# Patient Record
Sex: FEMALE | Race: ASIAN | NOT HISPANIC OR LATINO
[De-identification: names, ages, dates, MRNs, and addresses within clinical notes are randomized per-mention and may not be internally consistent; named-entity substitution may affect disease eponyms.]

---

## 2022-07-27 ENCOUNTER — NON-APPOINTMENT (OUTPATIENT)
Age: 57
End: 2022-07-27

## 2022-07-28 ENCOUNTER — INPATIENT (INPATIENT)
Facility: HOSPITAL | Age: 57
LOS: 2 days | Discharge: HOME | DRG: 446 | End: 2022-07-31
Attending: SURGERY | Admitting: SURGERY
Payer: MEDICAID

## 2022-07-28 VITALS
OXYGEN SATURATION: 96 % | RESPIRATION RATE: 16 BRPM | HEART RATE: 92 BPM | DIASTOLIC BLOOD PRESSURE: 86 MMHG | HEIGHT: 65 IN | SYSTOLIC BLOOD PRESSURE: 157 MMHG | WEIGHT: 179.9 LBS | TEMPERATURE: 98 F

## 2022-07-28 LAB
ALBUMIN SERPL ELPH-MCNC: 3.6 G/DL — SIGNIFICANT CHANGE UP (ref 3.3–5)
ALP SERPL-CCNC: 199 U/L — HIGH (ref 40–120)
ALT FLD-CCNC: 125 U/L — HIGH (ref 12–78)
ANION GAP SERPL CALC-SCNC: 11 MMOL/L — SIGNIFICANT CHANGE UP (ref 5–17)
APTT BLD: 35.7 SEC — HIGH (ref 27.5–35.5)
AST SERPL-CCNC: 43 U/L — HIGH (ref 15–37)
BASOPHILS # BLD AUTO: 0.02 K/UL — SIGNIFICANT CHANGE UP (ref 0–0.2)
BASOPHILS NFR BLD AUTO: 0.2 % — SIGNIFICANT CHANGE UP (ref 0–2)
BILIRUB SERPL-MCNC: 0.7 MG/DL — SIGNIFICANT CHANGE UP (ref 0.2–1.2)
BUN SERPL-MCNC: 6 MG/DL — LOW (ref 7–23)
CALCIUM SERPL-MCNC: 8.9 MG/DL — SIGNIFICANT CHANGE UP (ref 8.5–10.1)
CHLORIDE SERPL-SCNC: 105 MMOL/L — SIGNIFICANT CHANGE UP (ref 96–108)
CK MB CFR SERPL CALC: <1 NG/ML — SIGNIFICANT CHANGE UP (ref 0–3.6)
CO2 SERPL-SCNC: 24 MMOL/L — SIGNIFICANT CHANGE UP (ref 22–31)
CREAT SERPL-MCNC: 0.6 MG/DL — SIGNIFICANT CHANGE UP (ref 0.5–1.3)
EGFR: 104 ML/MIN/1.73M2 — SIGNIFICANT CHANGE UP
EOSINOPHIL # BLD AUTO: 0.09 K/UL — SIGNIFICANT CHANGE UP (ref 0–0.5)
EOSINOPHIL NFR BLD AUTO: 0.8 % — SIGNIFICANT CHANGE UP (ref 0–6)
GLUCOSE SERPL-MCNC: 128 MG/DL — HIGH (ref 70–99)
HCT VFR BLD CALC: 36.9 % — SIGNIFICANT CHANGE UP (ref 34.5–45)
HGB BLD-MCNC: 11.7 G/DL — SIGNIFICANT CHANGE UP (ref 11.5–15.5)
IMM GRANULOCYTES NFR BLD AUTO: 0.5 % — SIGNIFICANT CHANGE UP (ref 0–1.5)
INR BLD: 1.05 RATIO — SIGNIFICANT CHANGE UP (ref 0.88–1.16)
LIDOCAIN IGE QN: 186 U/L — SIGNIFICANT CHANGE UP (ref 73–393)
LYMPHOCYTES # BLD AUTO: 1.85 K/UL — SIGNIFICANT CHANGE UP (ref 1–3.3)
LYMPHOCYTES # BLD AUTO: 16.9 % — SIGNIFICANT CHANGE UP (ref 13–44)
MCHC RBC-ENTMCNC: 22.9 PG — LOW (ref 27–34)
MCHC RBC-ENTMCNC: 31.7 GM/DL — LOW (ref 32–36)
MCV RBC AUTO: 72.1 FL — LOW (ref 80–100)
MONOCYTES # BLD AUTO: 0.69 K/UL — SIGNIFICANT CHANGE UP (ref 0–0.9)
MONOCYTES NFR BLD AUTO: 6.3 % — SIGNIFICANT CHANGE UP (ref 2–14)
NEUTROPHILS # BLD AUTO: 8.24 K/UL — HIGH (ref 1.8–7.4)
NEUTROPHILS NFR BLD AUTO: 75.3 % — SIGNIFICANT CHANGE UP (ref 43–77)
NRBC # BLD: 0 /100 WBCS — SIGNIFICANT CHANGE UP (ref 0–0)
PLATELET # BLD AUTO: 396 K/UL — SIGNIFICANT CHANGE UP (ref 150–400)
POTASSIUM SERPL-MCNC: 3.3 MMOL/L — LOW (ref 3.5–5.3)
POTASSIUM SERPL-SCNC: 3.3 MMOL/L — LOW (ref 3.5–5.3)
PROT SERPL-MCNC: 8.6 G/DL — HIGH (ref 6–8.3)
PROTHROM AB SERPL-ACNC: 12.3 SEC — SIGNIFICANT CHANGE UP (ref 10.5–13.4)
RBC # BLD: 5.12 M/UL — SIGNIFICANT CHANGE UP (ref 3.8–5.2)
RBC # FLD: 15.8 % — HIGH (ref 10.3–14.5)
SARS-COV-2 RNA SPEC QL NAA+PROBE: SIGNIFICANT CHANGE UP
SODIUM SERPL-SCNC: 140 MMOL/L — SIGNIFICANT CHANGE UP (ref 135–145)
TROPONIN I, HIGH SENSITIVITY RESULT: 4.2 NG/L — SIGNIFICANT CHANGE UP
WBC # BLD: 10.95 K/UL — HIGH (ref 3.8–10.5)
WBC # FLD AUTO: 10.95 K/UL — HIGH (ref 3.8–10.5)

## 2022-07-28 PROCEDURE — 71045 X-RAY EXAM CHEST 1 VIEW: CPT | Mod: 26

## 2022-07-28 PROCEDURE — 93010 ELECTROCARDIOGRAM REPORT: CPT

## 2022-07-28 PROCEDURE — 99285 EMERGENCY DEPT VISIT HI MDM: CPT

## 2022-07-28 PROCEDURE — 76705 ECHO EXAM OF ABDOMEN: CPT | Mod: 26

## 2022-07-28 RX ORDER — SODIUM CHLORIDE 9 MG/ML
1000 INJECTION INTRAMUSCULAR; INTRAVENOUS; SUBCUTANEOUS ONCE
Refills: 0 | Status: COMPLETED | OUTPATIENT
Start: 2022-07-28 | End: 2022-07-28

## 2022-07-28 RX ORDER — FAMOTIDINE 10 MG/ML
20 INJECTION INTRAVENOUS ONCE
Refills: 0 | Status: COMPLETED | OUTPATIENT
Start: 2022-07-28 | End: 2022-07-28

## 2022-07-28 RX ORDER — ACETAMINOPHEN 500 MG
1000 TABLET ORAL ONCE
Refills: 0 | Status: COMPLETED | OUTPATIENT
Start: 2022-07-28 | End: 2022-07-28

## 2022-07-28 RX ADMIN — Medication 400 MILLIGRAM(S): at 22:34

## 2022-07-28 RX ADMIN — Medication 1000 MILLIGRAM(S): at 22:49

## 2022-07-28 RX ADMIN — FAMOTIDINE 20 MILLIGRAM(S): 10 INJECTION INTRAVENOUS at 22:34

## 2022-07-28 RX ADMIN — SODIUM CHLORIDE 1000 MILLILITER(S): 9 INJECTION INTRAMUSCULAR; INTRAVENOUS; SUBCUTANEOUS at 22:35

## 2022-07-28 RX ADMIN — Medication 1000 MILLIGRAM(S): at 23:00

## 2022-07-28 NOTE — ED ADULT NURSE NOTE - OBJECTIVE STATEMENT
57 yr old female c/o epigastric/RUQ abdominal pain radiating to the right flank. A+O x 4. Speaks German only. Daughter at bedside and translating. Pt was BIBA from urgent care for ED evaluation. RUQ tenderness noted on exam. Pt reports nausea without vomiting. Tolerating food without incident, but reports increased pain 1-2 hours after eating. + BS in all quadrants. S1/S2. Lungs clear dee. Resp even and unlabored on room air. skin warm dry and intact.

## 2022-07-28 NOTE — ED PROVIDER NOTE - OBJECTIVE STATEMENT
Patient is a 57-year-old female with past medical history of hypertension hyperlipidemia hysterectomy complaining of intermittent epigastric pain for 3 days worsening today pain radiating to right side back patient denies any nausea vomiting fever chills urinary symptoms blood in the urine chest pain shortness of breath.  Patient went to urgent care and advised to come to the ER.    pcp Conor

## 2022-07-28 NOTE — ED PROVIDER NOTE - ATTENDING APP SHARED VISIT CONTRIBUTION OF CARE
56yo female with RUQ Pain for the last few days no vomiting or diarrhea  exam:+RUQ tenderness no rebound or guadring  plan: labs, us, r/o eagle  agree with assessment and plan of PA

## 2022-07-28 NOTE — ED ADULT TRIAGE NOTE - CHIEF COMPLAINT QUOTE
Patient brought in by ambulance from urgent care as reported complaining of epigastric pain radiating to the back was referred to ED

## 2022-07-29 ENCOUNTER — TRANSCRIPTION ENCOUNTER (OUTPATIENT)
Age: 57
End: 2022-07-29

## 2022-07-29 DIAGNOSIS — I10 ESSENTIAL (PRIMARY) HYPERTENSION: ICD-10-CM

## 2022-07-29 DIAGNOSIS — Z90.710 ACQUIRED ABSENCE OF BOTH CERVIX AND UTERUS: Chronic | ICD-10-CM

## 2022-07-29 DIAGNOSIS — E87.6 HYPOKALEMIA: ICD-10-CM

## 2022-07-29 DIAGNOSIS — R74.8 ABNORMAL LEVELS OF OTHER SERUM ENZYMES: ICD-10-CM

## 2022-07-29 DIAGNOSIS — K80.20 CALCULUS OF GALLBLADDER WITHOUT CHOLECYSTITIS WITHOUT OBSTRUCTION: ICD-10-CM

## 2022-07-29 DIAGNOSIS — E78.5 HYPERLIPIDEMIA, UNSPECIFIED: ICD-10-CM

## 2022-07-29 LAB
ALBUMIN SERPL ELPH-MCNC: 3.3 G/DL — SIGNIFICANT CHANGE UP (ref 3.3–5)
ALP SERPL-CCNC: 183 U/L — HIGH (ref 40–120)
ALT FLD-CCNC: 100 U/L — HIGH (ref 12–78)
ANION GAP SERPL CALC-SCNC: 9 MMOL/L — SIGNIFICANT CHANGE UP (ref 5–17)
AST SERPL-CCNC: 31 U/L — SIGNIFICANT CHANGE UP (ref 15–37)
BILIRUB SERPL-MCNC: 0.6 MG/DL — SIGNIFICANT CHANGE UP (ref 0.2–1.2)
BUN SERPL-MCNC: 5 MG/DL — LOW (ref 7–23)
CALCIUM SERPL-MCNC: 8.8 MG/DL — SIGNIFICANT CHANGE UP (ref 8.5–10.1)
CHLORIDE SERPL-SCNC: 107 MMOL/L — SIGNIFICANT CHANGE UP (ref 96–108)
CO2 SERPL-SCNC: 25 MMOL/L — SIGNIFICANT CHANGE UP (ref 22–31)
CREAT SERPL-MCNC: 0.52 MG/DL — SIGNIFICANT CHANGE UP (ref 0.5–1.3)
EGFR: 108 ML/MIN/1.73M2 — SIGNIFICANT CHANGE UP
GLUCOSE SERPL-MCNC: 104 MG/DL — HIGH (ref 70–99)
HCT VFR BLD CALC: 34.5 % — SIGNIFICANT CHANGE UP (ref 34.5–45)
HGB BLD-MCNC: 10.8 G/DL — LOW (ref 11.5–15.5)
INR BLD: 1.08 RATIO — SIGNIFICANT CHANGE UP (ref 0.88–1.16)
MCHC RBC-ENTMCNC: 22.9 PG — LOW (ref 27–34)
MCHC RBC-ENTMCNC: 31.3 GM/DL — LOW (ref 32–36)
MCV RBC AUTO: 73.2 FL — LOW (ref 80–100)
NRBC # BLD: 0 /100 WBCS — SIGNIFICANT CHANGE UP (ref 0–0)
PLATELET # BLD AUTO: 351 K/UL — SIGNIFICANT CHANGE UP (ref 150–400)
POTASSIUM SERPL-MCNC: 3.5 MMOL/L — SIGNIFICANT CHANGE UP (ref 3.5–5.3)
POTASSIUM SERPL-SCNC: 3.5 MMOL/L — SIGNIFICANT CHANGE UP (ref 3.5–5.3)
PROT SERPL-MCNC: 8 G/DL — SIGNIFICANT CHANGE UP (ref 6–8.3)
PROTHROM AB SERPL-ACNC: 12.6 SEC — SIGNIFICANT CHANGE UP (ref 10.5–13.4)
RBC # BLD: 4.71 M/UL — SIGNIFICANT CHANGE UP (ref 3.8–5.2)
RBC # FLD: 15.8 % — HIGH (ref 10.3–14.5)
SODIUM SERPL-SCNC: 141 MMOL/L — SIGNIFICANT CHANGE UP (ref 135–145)
WBC # BLD: 9.33 K/UL — SIGNIFICANT CHANGE UP (ref 3.8–10.5)
WBC # FLD AUTO: 9.33 K/UL — SIGNIFICANT CHANGE UP (ref 3.8–10.5)

## 2022-07-29 PROCEDURE — 99222 1ST HOSP IP/OBS MODERATE 55: CPT

## 2022-07-29 PROCEDURE — 74177 CT ABD & PELVIS W/CONTRAST: CPT | Mod: 26

## 2022-07-29 PROCEDURE — 99223 1ST HOSP IP/OBS HIGH 75: CPT

## 2022-07-29 PROCEDURE — 78226 HEPATOBILIARY SYSTEM IMAGING: CPT | Mod: 26

## 2022-07-29 RX ORDER — SUCRALFATE 1 G
1 TABLET ORAL
Refills: 0 | Status: DISCONTINUED | OUTPATIENT
Start: 2022-07-29 | End: 2022-07-31

## 2022-07-29 RX ORDER — PIPERACILLIN AND TAZOBACTAM 4; .5 G/20ML; G/20ML
3.38 INJECTION, POWDER, LYOPHILIZED, FOR SOLUTION INTRAVENOUS EVERY 8 HOURS
Refills: 0 | Status: DISCONTINUED | OUTPATIENT
Start: 2022-07-29 | End: 2022-07-31

## 2022-07-29 RX ORDER — PIPERACILLIN AND TAZOBACTAM 4; .5 G/20ML; G/20ML
3.38 INJECTION, POWDER, LYOPHILIZED, FOR SOLUTION INTRAVENOUS ONCE
Refills: 0 | Status: COMPLETED | OUTPATIENT
Start: 2022-07-29 | End: 2022-07-29

## 2022-07-29 RX ORDER — HYDROMORPHONE HYDROCHLORIDE 2 MG/ML
0.5 INJECTION INTRAMUSCULAR; INTRAVENOUS; SUBCUTANEOUS EVERY 4 HOURS
Refills: 0 | Status: DISCONTINUED | OUTPATIENT
Start: 2022-07-29 | End: 2022-07-31

## 2022-07-29 RX ORDER — SINCALIDE 5 UG/5ML
1.6 INJECTION, POWDER, LYOPHILIZED, FOR SOLUTION INTRAVENOUS ONCE
Refills: 0 | Status: COMPLETED | OUTPATIENT
Start: 2022-07-29 | End: 2022-07-29

## 2022-07-29 RX ORDER — HYDROMORPHONE HYDROCHLORIDE 2 MG/ML
1 INJECTION INTRAMUSCULAR; INTRAVENOUS; SUBCUTANEOUS EVERY 4 HOURS
Refills: 0 | Status: DISCONTINUED | OUTPATIENT
Start: 2022-07-29 | End: 2022-07-31

## 2022-07-29 RX ORDER — POTASSIUM CHLORIDE 20 MEQ
10 PACKET (EA) ORAL
Refills: 0 | Status: COMPLETED | OUTPATIENT
Start: 2022-07-29 | End: 2022-07-29

## 2022-07-29 RX ORDER — PANTOPRAZOLE SODIUM 20 MG/1
40 TABLET, DELAYED RELEASE ORAL
Refills: 0 | Status: DISCONTINUED | OUTPATIENT
Start: 2022-07-29 | End: 2022-07-29

## 2022-07-29 RX ORDER — KETOROLAC TROMETHAMINE 30 MG/ML
15 SYRINGE (ML) INJECTION EVERY 6 HOURS
Refills: 0 | Status: DISCONTINUED | OUTPATIENT
Start: 2022-07-29 | End: 2022-07-31

## 2022-07-29 RX ORDER — PANTOPRAZOLE SODIUM 20 MG/1
40 TABLET, DELAYED RELEASE ORAL EVERY 12 HOURS
Refills: 0 | Status: DISCONTINUED | OUTPATIENT
Start: 2022-07-29 | End: 2022-07-31

## 2022-07-29 RX ORDER — SODIUM CHLORIDE 9 MG/ML
1000 INJECTION, SOLUTION INTRAVENOUS
Refills: 0 | Status: DISCONTINUED | OUTPATIENT
Start: 2022-07-29 | End: 2022-07-31

## 2022-07-29 RX ORDER — POLYETHYLENE GLYCOL 3350 17 G/17G
17 POWDER, FOR SOLUTION ORAL DAILY
Refills: 0 | Status: DISCONTINUED | OUTPATIENT
Start: 2022-07-29 | End: 2022-07-31

## 2022-07-29 RX ADMIN — SINCALIDE 103.2 MICROGRAM(S): 5 INJECTION, POWDER, LYOPHILIZED, FOR SOLUTION INTRAVENOUS at 11:40

## 2022-07-29 RX ADMIN — PANTOPRAZOLE SODIUM 40 MILLIGRAM(S): 20 TABLET, DELAYED RELEASE ORAL at 17:33

## 2022-07-29 RX ADMIN — HYDROMORPHONE HYDROCHLORIDE 1 MILLIGRAM(S): 2 INJECTION INTRAMUSCULAR; INTRAVENOUS; SUBCUTANEOUS at 06:57

## 2022-07-29 RX ADMIN — Medication 100 MILLIEQUIVALENT(S): at 03:30

## 2022-07-29 RX ADMIN — Medication 100 MILLIEQUIVALENT(S): at 06:32

## 2022-07-29 RX ADMIN — HYDROMORPHONE HYDROCHLORIDE 0.5 MILLIGRAM(S): 2 INJECTION INTRAMUSCULAR; INTRAVENOUS; SUBCUTANEOUS at 21:45

## 2022-07-29 RX ADMIN — HYDROMORPHONE HYDROCHLORIDE 0.5 MILLIGRAM(S): 2 INJECTION INTRAMUSCULAR; INTRAVENOUS; SUBCUTANEOUS at 22:15

## 2022-07-29 RX ADMIN — HYDROMORPHONE HYDROCHLORIDE 1 MILLIGRAM(S): 2 INJECTION INTRAMUSCULAR; INTRAVENOUS; SUBCUTANEOUS at 07:30

## 2022-07-29 RX ADMIN — PIPERACILLIN AND TAZOBACTAM 25 GRAM(S): 4; .5 INJECTION, POWDER, LYOPHILIZED, FOR SOLUTION INTRAVENOUS at 20:38

## 2022-07-29 RX ADMIN — PIPERACILLIN AND TAZOBACTAM 25 GRAM(S): 4; .5 INJECTION, POWDER, LYOPHILIZED, FOR SOLUTION INTRAVENOUS at 12:28

## 2022-07-29 RX ADMIN — PIPERACILLIN AND TAZOBACTAM 200 GRAM(S): 4; .5 INJECTION, POWDER, LYOPHILIZED, FOR SOLUTION INTRAVENOUS at 03:20

## 2022-07-29 RX ADMIN — Medication 100 MILLIEQUIVALENT(S): at 05:20

## 2022-07-29 NOTE — H&P ADULT - PROBLEM SELECTOR PLAN 1
- Admit to surgical service  - NPO, IV fluids  - IV zosyn  - HIDA in AM  - Repeat CBC, CMP in AM  - PPI  - Medical and GI consults called  - Discussed with Dr. Marquez

## 2022-07-29 NOTE — H&P ADULT - HISTORY OF PRESENT ILLNESS
57 year old female with PMH HTN, HLD, s/p hysterectomy, presents with 3 day history of intermittent epigastric abdominal pain, worse with eating and inspiration, 8/10 at the worst, with 1 episode of vomiting, although patient currently without nausea. Also reports decreased PO intake due to pain. She also endorses constipation; last BM this AM was small and hard. She denies any fever, chills, chest pain, shortness of breath, hematemesis, diarrhea.

## 2022-07-29 NOTE — H&P ADULT - NSHPLABSRESULTS_GEN_ALL_CORE
LABS:                   11.7   10.95 )-----------( 396      ( 28 Jul 2022 21:15 )             36.9     07-28    140  |  105  |  6<L>  ----------------------------<  128<H>  3.3<L>   |  24  |  0.60    Ca    8.9      28 Jul 2022 21:15    TPro  8.6<H>  /  Alb  3.6  /  TBili  0.7  /  DBili  x   /  AST  43<H>  /  ALT  125<H>  /  AlkPhos  199<H>  07-28    PT/INR - ( 28 Jul 2022 21:15 )   PT: 12.3 sec;   INR: 1.05 ratio  PTT - ( 28 Jul 2022 21:15 )  PTT:35.7 sec    RADIOLOGY:  < from: US Abdomen Upper Quadrant Right (07.28.22 @ 22:33) >  IMPRESSION:  Cholelithiasis with a mildly dilated common bile duct. Cannot exclude   distal choledocholithiasis. No sonographic evidence of acute   cholecystitis.  Right renal pelvic calcification with pelviectasis.

## 2022-07-29 NOTE — H&P ADULT - NSHPPHYSICALEXAM_GEN_ALL_CORE
GENERAL:  Well-nourished, well-developed female lying comfortably in bed in NAD.  HEENT:  NC/AT. Sclera white.  CARDIO:  Regular rate and rhythm.  No murmur, gallop or rub appreciated.  RESPIRATORY:  Nonlabored breathing, no accessory muscle use. Lungs clear to auscultation bilaterally.  No wheezing, rales or rhonchi appreciated.  ABDOMEN:  Soft, nondistended, +moderate tenderness in epigastric region. No rebound tenderness or guarding.  SKIN:  No jaundice, pallor, or cyanosis  NEURO:  Alert

## 2022-07-29 NOTE — CONSULT NOTE ADULT - PROBLEM SELECTOR RECOMMENDATION 2
likely in the setting of possible choledocolithiasis   AST 43  alk phos 199  avoid hepatotoxic medications   fu am labs   GI consult

## 2022-07-29 NOTE — CONSULT NOTE ADULT - SUBJECTIVE AND OBJECTIVE BOX
[facility]  Austin Chan PA-C  [address] [place] [id number]  [phone]      Chief Complaint:  Patient is a 57y old  Female who presents with a chief complaint of Abdominal pain (29 Jul 2022 04:13)      HPI:57 year old female with PMH HTN, HLD, s/p hysterectomy, presents with 3 day history of intermittent epigastric abdominal pain, worse with eating and inspiration, 8/10 at the worst, with 1 episode of vomiting, although patient currently without nausea. Also reports decreased PO intake due to pain. She also endorses constipation; last BM this AM was small and hard. She denies any fever, chills, chest pain, shortness of breath, hematemesis, diarrhea.    Allergies:  No Known Allergies      Medications:  HYDROmorphone  Injectable 0.5 milliGRAM(s) IV Push every 4 hours PRN  HYDROmorphone  Injectable 1 milliGRAM(s) IV Push every 4 hours PRN  ketorolac   Injectable 15 milliGRAM(s) IV Push every 6 hours PRN  lactated ringers. 1000 milliLiter(s) IV Continuous <Continuous>  pantoprazole    Tablet 40 milliGRAM(s) Oral every 12 hours  piperacillin/tazobactam IVPB.. 3.375 Gram(s) IV Intermittent every 8 hours  polyethylene glycol 3350 17 Gram(s) Oral daily      PMHX/PSHX:  Hypertension    Hyperlipidemia    S/P hysterectomy        Family history:      Social History:     ROS:     General:  No wt loss, fevers, chills, night sweats, fatigue,   Eyes:  Good vision, no reported pain  ENT:  No sore throat, pain, runny nose, dysphagia  CV:  No pain, palpitations, hypo/hypertension  Resp:  No dyspnea, cough, tachypnea, wheezing  GI:  No pain, No nausea, No vomiting, No diarrhea, No constipation, No weight loss, No fever, No pruritis, No rectal bleeding, No tarry stools, No dysphagia,  :  No pain, bleeding, incontinence, nocturia  Muscle:  No pain, weakness  Neuro:  No weakness, tingling, memory problems  Psych:  No fatigue, insomnia, mood problems, depression  Endocrine:  No polyuria, polydipsia, cold/heat intolerance  Heme:  No petechiae, ecchymosis, easy bruisability  Skin:  No rash, tattoos, scars, edema      PHYSICAL EXAM:   Vital Signs:  Vital Signs Last 24 Hrs  T(C): 37.1 (29 Jul 2022 15:05), Max: 37.3 (29 Jul 2022 00:21)  T(F): 98.7 (29 Jul 2022 15:05), Max: 99.2 (29 Jul 2022 00:21)  HR: 80 (29 Jul 2022 15:05) (78 - 92)  BP: 149/87 (29 Jul 2022 15:05) (136/75 - 159/90)  BP(mean): --  RR: 17 (29 Jul 2022 15:05) (15 - 18)  SpO2: 98% (29 Jul 2022 15:05) (96% - 98%)    Parameters below as of 29 Jul 2022 15:05  Patient On (Oxygen Delivery Method): room air      Daily Height in cm: 165.1 (29 Jul 2022 15:22)    Daily     GENERAL:  Appears stated age,   HEENT:  NC/AT,    CHEST:  Full & symmetric excursion,   HEART:  Regular rhythm  ABDOMEN:  Soft, non-tender, non-distended,   EXTEREMITIES:  no cyanosis,clubbing or edema  SKIN:  No rash  NEURO:  Alert,    LABS:                        10.8   9.33  )-----------( 351      ( 29 Jul 2022 09:54 )             34.5     07-29    141  |  107  |  5<L>  ----------------------------<  104<H>  3.5   |  25  |  0.52    Ca    8.8      29 Jul 2022 09:54    TPro  8.0  /  Alb  3.3  /  TBili  0.6  /  DBili  x   /  AST  31  /  ALT  100<H>  /  AlkPhos  183<H>  07-29    LIVER FUNCTIONS - ( 29 Jul 2022 09:54 )  Alb: 3.3 g/dL / Pro: 8.0 g/dL / ALK PHOS: 183 U/L / ALT: 100 U/L / AST: 31 U/L / GGT: x           PT/INR - ( 29 Jul 2022 12:46 )   PT: 12.6 sec;   INR: 1.08 ratio         PTT - ( 28 Jul 2022 21:15 )  PTT:35.7 sec    Amylase Serum--      Lipase [id number]       Ammonia--      Imaging:          
Patient is a 57y old  Female who presents with a chief complaint of Abdominal pain (29 Jul 2022 02:20)      FROM ADMISSION H+P:   HPI:  57 year old female with PMH HTN, HLD, s/p hysterectomy, presents with 3 day history of intermittent epigastric abdominal pain, worse with eating and inspiration, 8/10 at the worst, with 1 episode of vomiting, although patient currently without nausea. Also reports decreased PO intake due to pain. She also endorses constipation; last BM this AM was small and hard. She denies any fever, chills, chest pain, shortness of breath, hematemesis, diarrhea. (29 Jul 2022 02:20)      ----  INTERVAL HPI/OVERNIGHT EVENTS: Pt seen and evaluated at the bedside. Asked to consult on patient for medical comanagement. 57 y F PMHx HTN, HLD presents with complaint of abdominal pain. Patient with 3 days of intermittent epigastric pain with nausea and vomiting. No previous similar episodes. Pain 8/10 on arrival. Now complaining of mild epigastric pain. Denies current nausea. Denies problems with anesthesia in the past. Is active in daily life. METs>4. Not on any blood thinners. Daughter at bedside providing translation per patient request.     ----  PAST MEDICAL & SURGICAL HISTORY:  Hypertension      Hyperlipidemia      S/P hysterectomy  2011          ----  Home medications:    ----  FAMILY HISTORY: denies history of heart disease       ----  Allergies    No Known Allergies    Intolerances        ----  Social History:  - etoh: denies  - tobacco: denies  - recreational drug use: denies  - occupation:  - living circumstances: family   - ambulation status: independent     ----  REVIEW OF SYSTEMS:  CONSTITUTIONAL: denies fever, chills, fatigue, weakness  HEENT: denies blurred vision, sore throat  SKIN: denies new lesions, rash  CARDIOVASCULAR: denies chest pain, chest pressure, palpitations  RESPIRATORY: denies shortness of breath, sputum production  GASTROINTESTINAL: admits nausea, vomiting, diarrhea, abdominal pain  GENITOURINARY: denies dysuria, discharge  NEUROLOGICAL: denies numbness, headache, focal weakness  MUSCULOSKELETAL: denies new joint pain, muscle aches  HEMATOLOGIC: denies gross bleeding, bruising  LYMPHATICS: denies enlarged lymph nodes, extremity swelling  PSYCHIATRIC: denies recent changes in anxiety, depression  ENDOCRINOLOGIC: denies sweating, cold or heat intolerance    ----  PHYSICAL EXAM:  GENERAL: patient appears well, no acute distress, appropriately interactive  EYES: sclera clear, no exudates  ENMT: oropharynx clear without erythema, moist mucous membranes  NECK: supple, soft, no thyromegaly noted  LUNGS: good air entry bilaterally, clear to auscultation, symmetric breath sounds, no wheezing or rhonchi appreciated  HEART: soft S1/S2, regular rate and rhythm, no murmurs noted, no noted edema to b/l LE  GASTROINTESTINAL: abdomen is soft, TTP in RUQ and epigastrum, nondistended, normoactive bowel sounds, no palpable masses  INTEGUMENT: good skin turgor, appropriate for ethnicity, appears well perfused, no jaundice noted  MUSCULOSKELETAL: no clubbing or cyanosis, no obvious deformity  NEUROLOGIC: awake, alert, oriented x3, good muscle tone in 4 extremities, no obvious sensory deficits  PSYCHIATRIC: mood is good, affect is congruent with mood, linear and logical thought process  HEME/LYMPH: no palpable supraclavicular nodules, no obvious ecchymosis     T(C): 37.3 (07-29-22 @ 00:21), Max: 37.3 (07-29-22 @ 00:21)  HR: 78 (07-29-22 @ 00:21) (78 - 92)  BP: 136/75 (07-29-22 @ 00:21) (136/75 - 157/86)  RR: 15 (07-29-22 @ 00:21) (15 - 16)  SpO2: 97% (07-29-22 @ 00:21) (96% - 97%)  Wt(kg): --    ----  I&O's Summary      LABS:                        11.7   10.95 )-----------( 396      ( 28 Jul 2022 21:15 )             36.9     07-28    140  |  105  |  6<L>  ----------------------------<  128<H>  3.3<L>   |  24  |  0.60    Ca    8.9      28 Jul 2022 21:15    TPro  8.6<H>  /  Alb  3.6  /  TBili  0.7  /  DBili  x   /  AST  43<H>  /  ALT  125<H>  /  AlkPhos  199<H>  07-28    PT/INR - ( 28 Jul 2022 21:15 )   PT: 12.3 sec;   INR: 1.05 ratio         PTT - ( 28 Jul 2022 21:15 )  PTT:35.7 sec    CAPILLARY BLOOD GLUCOSE          < from: US Abdomen Upper Quadrant Right (07.28.22 @ 22:33) >    ACC: [id number] EXAM:  US ABDOMEN RT UPR QUADRANT                          PROCEDURE DATE:  07/28/2022          INTERPRETATION:  CLINICAL INFORMATION: Right upper quadrant pain    COMPARISON: None available.    TECHNIQUE: Sonography of the right upper quadrant.    FINDINGS:  Liver: Within normal limits.  Bile ducts: Normal caliber. Common bile duct measures 7 mm.  Gallbladder: Cholelithiasis.  No focal tenderness or evidence of   cholecystitis.  Pancreas: Visualized portions are within normal limits.  Right kidney: 10.8 cm. No hydronephrosis. 2.4 cm pelvic calcification.   Mild pelviectasis..  Ascites: None.  IVC: Visualized portions are within normal limits.    IMPRESSION:  Cholelithiasis with a mildly dilated common bile duct. Cannot exclude   distal choledocholithiasis. No sonographic evidence of acute   cholecystitis.    Right renal pelvic calcification with pelviectasis.    --- End of Report ---            MATT JARAMILLO MD; Attending Radiologist  This document has been electronically signed. Jul 29 2022 12:03AM    < end of copied text >            ----  Personally reviewed:  Vital sign trends: [ x ] yes    [  ] no     [  ] n/a  Laboratory results: [ x ] yes    [  ] no     [  ] n/a  Radiology results: [x  ] yes    [  ] no     [  ] n/a  Culture results: [xs    [  ] no     [  ] n/a  Consultant recommendations: [ x ] yes    [  ] no     [  ] n/a

## 2022-07-29 NOTE — CONSULT NOTE ADULT - ASSESSMENT
abdominal pain  elevated lfts    lfts improved  hida negative  ? pud  agree with ct  agree with proton pump inhibitor  add carafate  d/w son    I reviewed the overnight course of events on the unit, re-confirming the patient history. I discussed the care with the patient and their family  The plan of care was discussed with the physician assistant and modifications were made to the notation where appropriate.   Differential diagnosis and plan of care discussed with patient after the evaluation  35 minutes spent on total encounter of which more than fifty percent of the encounter was spent counseling and/or coordinating care by the attending physician.  Advanced care planning was discussed with patient and family.  Advanced care planning forms were reviewed and discussed.  Risks, benefits and alternatives of gastroenterologic procedures were discussed in detail and all questions were answered.  
57 year old female with PMH HTN, HLD, s/p hysterectomy, here with cholelithiasis, possible choledocholithiasis, r/o acute cholecystitis.

## 2022-07-29 NOTE — CHART NOTE - NSCHARTNOTEFT_GEN_A_CORE
Patient admitted early this AM. Please see initial consult note for more detailed info.  Briefly, this is a 57 year old female with PMH HTN, HLD, s/p hysterectomy, here with cholelithiasis, possible choledocholithiasis, r/o acute cholecystitis.    Labs, vitals, notes, orders reviewed.    R/o acute cholecystitis:  -Continue Zosyn  -Continue IV fluids  -NPO  -Pain control  -Zofran PRN  -Decision re: surgical intervention pending HIDA results.   -GI consult pending

## 2022-07-29 NOTE — ED ADULT NURSE REASSESSMENT NOTE - NS ED NURSE REASSESS COMMENT FT1
Patient and report received at 0710.  Patient speaks German, she was able to communicate that pain medication helped.  per night RN daughter has been at bedside all night helping with translation.  patient is alert and calm, no acute distress noted.
Primary RN and charge RN attempted to place additional IV line without success.  Patient refuses to allow additional attempt.  Will hang LR after Zosyn dose is complete.
 #[id number] for German Language.  Patient understands she is waiting for the surgical team to come and evaluate her.  She understands she cannot eat or drink anything.  She understands she will need to remove her jewelry if she has to go to the OR.  Patient provided with a hospital gown so that she can remove her clothes and put them into a bag.  Her questions and concerns were addressed.  She denies needs at this time.

## 2022-07-29 NOTE — CONSULT NOTE ADULT - PROBLEM SELECTOR RECOMMENDATION 9
abdominal pain, nausea and vomiting  RUQ US with Cholelithiasis with a mildly dilated common bile duct. Cannot exclude distal choledocholithiasis. No sonographic evidence of acute cholecystitis.  admit to surgical service   NPO with IVF   continue zosyn   HIDA scan in AM  Agree with GI consult   fu am labs   fu cultures  pain control per primary team

## 2022-07-29 NOTE — H&P ADULT - NSHPREVIEWOFSYSTEMS_GEN_ALL_CORE
CONSTITUTIONAL: No weakness, fevers or chills  EYES/ENT: No visual changes;  No vertigo or throat pain   NECK: No pain or stiffness  RESPIRATORY: No cough, wheezing, hemoptysis; No shortness of breath  CARDIOVASCULAR: No chest pain or palpitations  GASTROINTESTINAL: See HPI. No hematemesis; No diarrhea. No melena or hematochezia.  GENITOURINARY: No dysuria, frequency or hematuria  NEUROLOGICAL: No numbness or weakness  SKIN: No itching, burning, rashes, or lesions   All other review of systems is negative unless indicated above.

## 2022-07-29 NOTE — PATIENT PROFILE ADULT - FALL HARM RISK - UNIVERSAL INTERVENTIONS
Bed in lowest position, wheels locked, appropriate side rails in place/Call bell, personal items and telephone in reach/Instruct patient to call for assistance before getting out of bed or chair/Non-slip footwear when patient is out of bed/[place] to call system/Physically safe environment - no spills, clutter or unnecessary equipment/Purposeful Proactive Rounding/Room/bathroom lighting operational, light cord in reach

## 2022-07-29 NOTE — CONSULT NOTE ADULT - TIME BILLING
note written by attending, see above   Reviewing medical record including consultant recommendations, labs, vitals, medications, orders, imaging, and discussion of plan of care with patient, family consultants, nursing.

## 2022-07-30 DIAGNOSIS — M25.569 PAIN IN UNSPECIFIED KNEE: ICD-10-CM

## 2022-07-30 DIAGNOSIS — K21.9 GASTRO-ESOPHAGEAL REFLUX DISEASE WITHOUT ESOPHAGITIS: ICD-10-CM

## 2022-07-30 LAB
ALBUMIN SERPL ELPH-MCNC: 3 G/DL — LOW (ref 3.3–5)
ALP SERPL-CCNC: 167 U/L — HIGH (ref 40–120)
ALT FLD-CCNC: 69 U/L — SIGNIFICANT CHANGE UP (ref 12–78)
ANION GAP SERPL CALC-SCNC: 11 MMOL/L — SIGNIFICANT CHANGE UP (ref 5–17)
AST SERPL-CCNC: 20 U/L — SIGNIFICANT CHANGE UP (ref 15–37)
BILIRUB SERPL-MCNC: 0.6 MG/DL — SIGNIFICANT CHANGE UP (ref 0.2–1.2)
BUN SERPL-MCNC: 5 MG/DL — LOW (ref 7–23)
CALCIUM SERPL-MCNC: 8.9 MG/DL — SIGNIFICANT CHANGE UP (ref 8.5–10.1)
CHLORIDE SERPL-SCNC: 105 MMOL/L — SIGNIFICANT CHANGE UP (ref 96–108)
CO2 SERPL-SCNC: 23 MMOL/L — SIGNIFICANT CHANGE UP (ref 22–31)
CREAT SERPL-MCNC: 0.74 MG/DL — SIGNIFICANT CHANGE UP (ref 0.5–1.3)
EGFR: 94 ML/MIN/1.73M2 — SIGNIFICANT CHANGE UP
GLUCOSE SERPL-MCNC: 160 MG/DL — HIGH (ref 70–99)
HCT VFR BLD CALC: 35.5 % — SIGNIFICANT CHANGE UP (ref 34.5–45)
HCV AB S/CO SERPL IA: 0.17 S/CO — SIGNIFICANT CHANGE UP (ref 0–0.99)
HCV AB SERPL-IMP: SIGNIFICANT CHANGE UP
HGB BLD-MCNC: 11.2 G/DL — LOW (ref 11.5–15.5)
MCHC RBC-ENTMCNC: 23 PG — LOW (ref 27–34)
MCHC RBC-ENTMCNC: 31.5 GM/DL — LOW (ref 32–36)
MCV RBC AUTO: 72.9 FL — LOW (ref 80–100)
NRBC # BLD: 0 /100 WBCS — SIGNIFICANT CHANGE UP (ref 0–0)
PLATELET # BLD AUTO: 391 K/UL — SIGNIFICANT CHANGE UP (ref 150–400)
POTASSIUM SERPL-MCNC: 3 MMOL/L — LOW (ref 3.5–5.3)
POTASSIUM SERPL-SCNC: 3 MMOL/L — LOW (ref 3.5–5.3)
PROT SERPL-MCNC: 7.7 G/DL — SIGNIFICANT CHANGE UP (ref 6–8.3)
RBC # BLD: 4.87 M/UL — SIGNIFICANT CHANGE UP (ref 3.8–5.2)
RBC # FLD: 15.8 % — HIGH (ref 10.3–14.5)
SODIUM SERPL-SCNC: 139 MMOL/L — SIGNIFICANT CHANGE UP (ref 135–145)
WBC # BLD: 7.91 K/UL — SIGNIFICANT CHANGE UP (ref 3.8–10.5)
WBC # FLD AUTO: 7.91 K/UL — SIGNIFICANT CHANGE UP (ref 3.8–10.5)

## 2022-07-30 PROCEDURE — 99232 SBSQ HOSP IP/OBS MODERATE 35: CPT | Mod: GC

## 2022-07-30 PROCEDURE — 99232 SBSQ HOSP IP/OBS MODERATE 35: CPT

## 2022-07-30 RX ORDER — POTASSIUM CHLORIDE 20 MEQ
10 PACKET (EA) ORAL
Refills: 0 | Status: COMPLETED | OUTPATIENT
Start: 2022-07-30 | End: 2022-07-30

## 2022-07-30 RX ORDER — POTASSIUM CHLORIDE 20 MEQ
40 PACKET (EA) ORAL EVERY 4 HOURS
Refills: 0 | Status: COMPLETED | OUTPATIENT
Start: 2022-07-30 | End: 2022-07-30

## 2022-07-30 RX ORDER — AMLODIPINE BESYLATE 2.5 MG/1
10 TABLET ORAL DAILY
Refills: 0 | Status: DISCONTINUED | OUTPATIENT
Start: 2022-07-30 | End: 2022-07-31

## 2022-07-30 RX ADMIN — PIPERACILLIN AND TAZOBACTAM 25 GRAM(S): 4; .5 INJECTION, POWDER, LYOPHILIZED, FOR SOLUTION INTRAVENOUS at 11:33

## 2022-07-30 RX ADMIN — PIPERACILLIN AND TAZOBACTAM 25 GRAM(S): 4; .5 INJECTION, POWDER, LYOPHILIZED, FOR SOLUTION INTRAVENOUS at 20:47

## 2022-07-30 RX ADMIN — Medication 40 MILLIEQUIVALENT(S): at 17:43

## 2022-07-30 RX ADMIN — Medication 15 MILLIGRAM(S): at 14:54

## 2022-07-30 RX ADMIN — Medication 40 MILLIEQUIVALENT(S): at 14:19

## 2022-07-30 RX ADMIN — Medication 100 MILLIEQUIVALENT(S): at 13:03

## 2022-07-30 RX ADMIN — PIPERACILLIN AND TAZOBACTAM 25 GRAM(S): 4; .5 INJECTION, POWDER, LYOPHILIZED, FOR SOLUTION INTRAVENOUS at 04:14

## 2022-07-30 RX ADMIN — Medication 1 GRAM(S): at 05:37

## 2022-07-30 RX ADMIN — PANTOPRAZOLE SODIUM 40 MILLIGRAM(S): 20 TABLET, DELAYED RELEASE ORAL at 17:43

## 2022-07-30 RX ADMIN — Medication 1 GRAM(S): at 17:43

## 2022-07-30 RX ADMIN — Medication 1 GRAM(S): at 00:12

## 2022-07-30 RX ADMIN — POLYETHYLENE GLYCOL 3350 17 GRAM(S): 17 POWDER, FOR SOLUTION ORAL at 11:33

## 2022-07-30 RX ADMIN — Medication 100 MILLIEQUIVALENT(S): at 11:33

## 2022-07-30 RX ADMIN — Medication 100 MILLIEQUIVALENT(S): at 14:19

## 2022-07-30 RX ADMIN — Medication 1 GRAM(S): at 11:33

## 2022-07-30 RX ADMIN — Medication 15 MILLIGRAM(S): at 14:24

## 2022-07-30 RX ADMIN — PANTOPRAZOLE SODIUM 40 MILLIGRAM(S): 20 TABLET, DELAYED RELEASE ORAL at 05:37

## 2022-07-30 NOTE — PROGRESS NOTE ADULT - ASSESSMENT
abdominal pain  elevated lfts    lfts improved  hida negative  ?PUD, PPI and carafate  CT noted, gallbladder and bile ducts within normal limits  will follow    I reviewed the overnight course of events on the unit, re-confirming the patient history. I discussed the care with the patient and their family  The plan of care was discussed with the physician assistant and modifications were made to the notation where appropriate.   Differential diagnosis and plan of care discussed with patient after the evaluation  35 minutes spent on total encounter of which more than fifty percent of the encounter was spent counseling and/or coordinating care by the attending physician.  Advanced care planning was discussed with patient and family.  Advanced care planning forms were reviewed and discussed.  Risks, benefits and alternatives of gastroenterologic procedures were discussed in detail and all questions were answered.   abdominal pain  elevated lfts    lfts improved  hida negative  ?PUD, PPI and carafate  CT noted, gallbladder and bile ducts within normal limits  ?significance of 1.8cm renal calcification  will follow    I reviewed the overnight course of events on the unit, re-confirming the patient history. I discussed the care with the patient and their family  The plan of care was discussed with the physician assistant and modifications were made to the notation where appropriate.   Differential diagnosis and plan of care discussed with patient after the evaluation  35 minutes spent on total encounter of which more than fifty percent of the encounter was spent counseling and/or coordinating care by the attending physician.  Advanced care planning was discussed with patient and family.  Advanced care planning forms were reviewed and discussed.  Risks, benefits and alternatives of gastroenterologic procedures were discussed in detail and all questions were answered.

## 2022-07-30 NOTE — PROGRESS NOTE ADULT - PROBLEM SELECTOR PLAN 5
Per pt daughter, patient is prescribed 10mg Prednisone questionable frequency of use  Will hold, but will consider potential adrenal insufficiency if patient has sudden hypotension Per pt daughter, patient is prescribed 10mg Prednisone unclear if patient takes this. Does not seem to take daily steroids, but will consider potential adrenal insufficiency if patient has sudden hypotension

## 2022-07-30 NOTE — PROGRESS NOTE ADULT - ASSESSMENT
57 year old female with PMH HTN, HLD, s/p hysterectomy, here with cholelithiasis. CT abd/pelvis & HIDA 7/29 both negative for GB pathology.

## 2022-07-30 NOTE — DISCHARGE NOTE PROVIDER - PROVIDER TOKENS
PROVIDER:[TOKEN:[[id number]]] PROVIDER:[TOKEN:[[id number]]],PROVIDER:[TOKEN:[[id number]]]

## 2022-07-30 NOTE — PROGRESS NOTE ADULT - PROBLEM SELECTOR PLAN 1
- Admitted to surgical service  - diet advanced  - pain improving, on dilaudid  - IV Zosyn   - HIDA negative   - Continue to trend LFTs and clinical status  - GI following - presented with abd pain, w/u in progress . possible PUD   - Admitted to surgical service  - diet advanced  - pain improving, on dilaudid  - IV Zosyn   - HIDA negative   - Continue to trend LFTs and clinical status  - GI and primary surgery following

## 2022-07-30 NOTE — DISCHARGE NOTE PROVIDER - NSDCFUADDAPPT_GEN_ALL_CORE_FT
Follow-up with Dr. Marquez/General Surgeon 1-2 weeks after discharge  Follow-up with Dr. Huston/Gastroenterology 1-2 weeks after discharge

## 2022-07-30 NOTE — DISCHARGE NOTE PROVIDER - NSDCCPCAREPLAN_GEN_ALL_CORE_FT
PRINCIPAL DISCHARGE DIAGNOSIS  Diagnosis: Biliary colic  Assessment and Plan of Treatment:        PRINCIPAL DISCHARGE DIAGNOSIS  Diagnosis: Biliary colic  Assessment and Plan of Treatment: with cholelithiasis      SECONDARY DISCHARGE DIAGNOSES  Diagnosis: PUD (peptic ulcer disease)  Assessment and Plan of Treatment: R/O PUD

## 2022-07-30 NOTE — DISCHARGE NOTE PROVIDER - NSDCFUADDINST_GEN_ALL_CORE_FT
You may resume your usual daily activity as tolerated.      Continue a lowfat diet upon discharge (as fatty meals may bring on abdominal pain).     Continue to take the Protonix and Carafate as prescribed.      Follow-up with Dr. Marquez and Dr. Huston as instructed above.     If you develop abdominal pain, nausea/vomiting, fevers, yellowing of the skin or eyes, or any combination of the above - return to the ED for further evaluation/treatment.

## 2022-07-30 NOTE — DISCHARGE NOTE PROVIDER - HOSPITAL COURSE
HPI: 57 year old female with PMH HTN, HLD, s/p hysterectomy, presents with 3 day history of intermittent epigastric abdominal pain, worse with eating and inspiration, 8/10 at the worst, with 1 episode of vomiting, although patient currently without nausea. Also reports decreased PO intake due to pain. She also endorses constipation; last BM this AM was small and hard. She denies any fever, chills, chest pain, shortness of breath, hematemesis, diarrhea. (29 Jul 2022 02:20)    7/28/22 RUQ US: Cholelithiasis with a mildly dilated common bile duct. Cannot exclude distal choledocholithiasis. No sonographic evidence of acute cholecystitis.  7/29/22: HIDA negative  7/29/22 CT abd/pelvis: No gallbladder wall thickening. No bowel obstruction, Small fat-containing umbilical hernia. 1.8 cm intrapelvic renal calcification with mild pelviectasis. Non-obstructing left intrarenal calculus.    Throughout hospital stay, patient was continued on antibiotics, pain was managed adequately. Lab values were monitored with resolution of elevated Wc, electrolytes were repleted as indicated.    Dispo: discharge home, to follow up with Dr. Marquez outpatient in 1 week.     HPI: 57 year old female with PMH HTN, HLD, s/p hysterectomy, presents with 3 day history of intermittent epigastric abdominal pain, worse with eating and inspiration, 8/10 at the worst, with 1 episode of vomiting, although patient currently without nausea. Also reports decreased PO intake due to pain. She also endorses constipation; last BM this AM was small and hard. She denies any fever, chills, chest pain, shortness of breath, hematemesis, diarrhea. (29 Jul 2022 02:20)    7/28/22 RUQ US: Cholelithiasis with a mildly dilated common bile duct. Cannot exclude distal choledocholithiasis. No sonographic evidence of acute cholecystitis.  7/29/22: HIDA negative  7/29/22 CT abd/pelvis: No gallbladder wall thickening. No bowel obstruction, Small fat-containing umbilical hernia. 1.8 cm intrapelvic renal calcification with mild pelviectasis. Non-obstructing left intrarenal calculus.    Medicine & GI consulted for assistance with pt management/plan of care.  Throughout hospital stay, patient was continued on antibiotics, pain was managed adequately. Lab values were monitored with resolution of elevated Wc, electrolytes were repleted as indicated.    Dispo: discharge home, to follow up with Dr. Marquez outpatient in 1 week.     HPI: 57 year old female with PMH HTN, HLD, s/p hysterectomy, presents with 3 day history of intermittent epigastric abdominal pain, worse with eating and inspiration, 8/10 at the worst, with 1 episode of vomiting, although patient currently without nausea. Also reports decreased PO intake due to pain. She also endorses constipation; last BM this AM was small and hard. She denies any fever, chills, chest pain, shortness of breath, hematemesis, diarrhea. (29 Jul 2022 02:20)    7/28/22 RUQ US: Cholelithiasis with a mildly dilated common bile duct. Cannot exclude distal choledocholithiasis. No sonographic evidence of acute cholecystitis.  7/29/22: HIDA negative  7/29/22 CT abd/pelvis: No gallbladder wall thickening. No bowel obstruction, Small fat-containing umbilical hernia. 1.8 cm intrapelvic renal calcification with mild pelviectasis. Non-obstructing left intrarenal calculus.    Medicine & GI consulted for assistance with pt management/plan of care.  There may be a component of PUD contributing to patient's symptomatology - carafate and PPI's were added to the medication regimen per GI.   Throughout hospital stay, patient was continued on antibiotics, pain was managed adequately. Lab values were monitored with resolution of elevated Wc, electrolytes were repleted as indicated.  Patient tolerated diet advance to lowfat without exacerbation of symptoms.  Initially, total bilirubin and lft's were elevated, but normalized and remained so for the remainder of her hospital stay - likely passed a stone.      Dispo: discharge to home, to follow up with Dr. Marquez from a General surgery perspective, and for discussion of possible interval cholecystectomy; as well to follow-up with Dr. Huston from GI for further evaluation/treatment options/possible upper endoscopy - patient should follow with both within 1-2 weeks of discharge.  Continuation of lowfat diet, and continuation of PPI and carafate recommended as well.

## 2022-07-30 NOTE — DISCHARGE NOTE PROVIDER - CARE PROVIDERS DIRECT ADDRESSES
[email or web address]_Unknown

## 2022-07-30 NOTE — DISCHARGE NOTE PROVIDER - NSDCMRMEDTOKEN_GEN_ALL_CORE_FT
pantoprazole 40 mg oral delayed release tablet: 1 tab(s) orally every 12 hours  sucralfate 1 g/10 mL oral suspension: 10 milliliter(s) orally 4 times a day

## 2022-07-30 NOTE — DISCHARGE NOTE PROVIDER - CARE PROVIDER_API CALL
Ronald Marquez)  Surgery  [address], [place] [id number]  Phone: [phone]  Fax: [phone]  Follow Up Time:    Ronald Marquez (MD)  Surgery  [address], [place] [id number]  Phone: [phone]  Fax: [phone]  Follow Up Time:     Santiago Huston (DO)  Gastroenterology; Internal Medicine  [address] [place] [address]  Phone: [phone]  Fax: [phone]  Follow Up Time:

## 2022-07-30 NOTE — PROGRESS NOTE ADULT - SUBJECTIVE AND OBJECTIVE BOX
Pt seen and examined at bedside, denies any overnight events. Spoke to patient in [facility], daughter at bedside. Vital signs stable. Admits to RUQ/epigastric pain, radiating to her back. Remains NPO. Denies any nausea/vomiting. (-) flatus, (-) BM.  Denies headache, chest pain, palpitations, shortness of breath, weakness or fatigue.    T(C): 37.2 (07-29-22 @ 22:11), Max: 37.4 (07-29-22 @ 16:46)  HR: 87 (07-29-22 @ 22:11) (80 - 87)  BP: 143/83 (07-29-22 @ 22:11) (143/83 - 171/93)  RR: 18 (07-29-22 @ 22:11) (17 - 18)  SpO2: 91% (07-29-22 @ 22:11) (91% - 98%)    PHYSICAL EXAM:  General: no acute distress, appears comfortable  Pulm: non labored respirations  Abdomen: soft/obese, tenderness RUQ & epigastric regions, nondistended, hypoactive BS  Extremities: no calf tenderness noted    I&O's Detail      LABS:                        10.8   9.33  )-----------( 351      ( 29 Jul 2022 09:54 )             34.5     07-29    141  |  107  |  5<L>  ----------------------------<  104<H>  3.5   |  25  |  0.52    Ca    8.8      29 Jul 2022 09:54    TPro  8.0  /  Alb  3.3  /  TBili  0.6  /  DBili  x   /  AST  31  /  ALT  100<H>  /  AlkPhos  183<H>  07-29    PT/INR - ( 29 Jul 2022 12:46 )   PT: 12.6 sec;   INR: 1.08 ratio    PTT - ( 28 Jul 2022 21:15 )  PTT:35.7 sec    MEDICATIONS:  HYDROmorphone  Injectable 0.5 milliGRAM(s) IV Push every 4 hours PRN  HYDROmorphone  Injectable 1 milliGRAM(s) IV Push every 4 hours PRN  ketorolac   Injectable 15 milliGRAM(s) IV Push every 6 hours PRN  lactated ringers. 1000 milliLiter(s) IV Continuous <Continuous>  pantoprazole    Tablet 40 milliGRAM(s) Oral every 12 hours  piperacillin/tazobactam IVPB.. 3.375 Gram(s) IV Intermittent every 8 hours  polyethylene glycol 3350 17 Gram(s) Oral daily  sucralfate suspension 1 Gram(s) Oral four times a day    RADIOLOGY:  < from: [facility] Hepatobiliary Imaging (07.29.22 @ 12:19) >  IMPRESSION: Normal hepatobiliary scan. No radionuclide evidence of acute   cholecystitis.    --- End of Report ---  CONNIE NEVAREZ MD; Attending Nuclear Medicine  This document has been electronically signed. Jul 29 2022  1:51PM    < end of copied text >    < from: CT Abdomen and Pelvis w/ IV Cont (07.29.22 @ 15:49) >  LIVER: Within normal limits.  BILE DUCTS: Normal caliber.  GALLBLADDER: No gallbladder wall thickening.  SPLEEN: Within normal limits.  PANCREAS: Within normal limits.  ADRENALS: Withinnormal limits.  KIDNEYS/URETERS:  1.8 cm right intrapelvic calcification with mild pelviectasis.    5 mm nonobstructing intrarenal calcification mid to lower pole left   kidney.    BLADDER: Within normal limits.  REPRODUCTIVE ORGANS: Hysterectomy.    BOWEL:  The evaluation of the stomach is limited without distention.  No bowel obstruction.   Appendix within normal limits.  PERITONEUM: No ascites.    VESSELS: Atherosclerotic changes.  RETROPERITONEUM/LYMPH NODES: No lymphadenopathy.    ABDOMINAL WALL:  Small fat-containing umbilical hernia.    BONES:  Sclerosis surrounding bilateral sacroiliac joints.    IMPRESSION:    1.8 cm intrapelvic renal calcification with mild pelviectasis.    Nonobstructing left intrarenal calculus.    Other findings as discussed above.    --- End of Report ---  YI TODD MD; Attending Radiologist  This document has been electronically signed. Jul 29 2022  4:35PM    < end of copied text >   Pt seen and examined at bedside, denies any overnight events. Spoke to patient in [facility], daughter at bedside. Vital signs stable. Admits to RUQ/epigastric pain, radiating to her back. Remains NPO. Denies any nausea/vomiting. (-) flatus, (-) BM.  Denies headache, chest pain, palpitations, shortness of breath, weakness or fatigue.    T(C): 37.2 (07-29-22 @ 22:11), Max: 37.4 (07-29-22 @ 16:46)  HR: 87 (07-29-22 @ 22:11) (80 - 87)  BP: 143/83 (07-29-22 @ 22:11) (143/83 - 171/93)  RR: 18 (07-29-22 @ 22:11) (17 - 18)  SpO2: 91% (07-29-22 @ 22:11) (91% - 98%)    PHYSICAL EXAM:  General: no acute distress, appears comfortable  HEENT: anicteric  Pulm: non labored respirations  CV: RRR  Abdomen: soft/obese, minimal tenderness RUQ & epigastric regions, nondistended, hypoactive BS  Extremities: no calf tenderness noted    I&O's Detail      LABS:                        10.8   9.33  )-----------( 351      ( 29 Jul 2022 09:54 )             34.5     07-29    141  |  107  |  5<L>  ----------------------------<  104<H>  3.5   |  25  |  0.52    Ca    8.8      29 Jul 2022 09:54    TPro  8.0  /  Alb  3.3  /  TBili  0.6  /  DBili  x   /  AST  31  /  ALT  100<H>  /  AlkPhos  183<H>  07-29    PT/INR - ( 29 Jul 2022 12:46 )   PT: 12.6 sec;   INR: 1.08 ratio    PTT - ( 28 Jul 2022 21:15 )  PTT:35.7 sec    MEDICATIONS:  HYDROmorphone  Injectable 0.5 milliGRAM(s) IV Push every 4 hours PRN  HYDROmorphone  Injectable 1 milliGRAM(s) IV Push every 4 hours PRN  ketorolac   Injectable 15 milliGRAM(s) IV Push every 6 hours PRN  lactated ringers. 1000 milliLiter(s) IV Continuous <Continuous>  pantoprazole    Tablet 40 milliGRAM(s) Oral every 12 hours  piperacillin/tazobactam IVPB.. 3.375 Gram(s) IV Intermittent every 8 hours  polyethylene glycol 3350 17 Gram(s) Oral daily  sucralfate suspension 1 Gram(s) Oral four times a day    RADIOLOGY:  < from: NM Hepatobiliary Imaging (07.29.22 @ 12:19) >  IMPRESSION: Normal hepatobiliary scan. No radionuclide evidence of acute   cholecystitis.    --- End of Report ---  CONNIE NEVAREZ MD; Attending Nuclear Medicine  This document has been electronically signed. Jul 29 2022  1:51PM    < end of copied text >    < from: CT Abdomen and Pelvis w/ IV Cont (07.29.22 @ 15:49) >  LIVER: Within normal limits.  BILE DUCTS: Normal caliber.  GALLBLADDER: No gallbladder wall thickening.  SPLEEN: Within normal limits.  PANCREAS: Within normal limits.  ADRENALS: Withinnormal limits.  KIDNEYS/URETERS:  1.8 cm right intrapelvic calcification with mild pelviectasis.    5 mm nonobstructing intrarenal calcification mid to lower pole left   kidney.    BLADDER: Within normal limits.  REPRODUCTIVE ORGANS: Hysterectomy.    BOWEL:  The evaluation of the stomach is limited without distention.  No bowel obstruction.   Appendix within normal limits.  PERITONEUM: No ascites.    VESSELS: Atherosclerotic changes.  RETROPERITONEUM/LYMPH NODES: No lymphadenopathy.    ABDOMINAL WALL:  Small fat-containing umbilical hernia.    BONES:  Sclerosis surrounding bilateral sacroiliac joints.    IMPRESSION:    1.8 cm intrapelvic renal calcification with mild pelviectasis.    Nonobstructing left intrarenal calculus.    Other findings as discussed above.    --- End of Report ---  YI TODD MD; Attending Radiologist  This document has been electronically signed. Jul 29 2022  4:35PM    < end of copied text >

## 2022-07-30 NOTE — PROGRESS NOTE ADULT - PROBLEM SELECTOR PLAN 1
- Continue CLD, IVF until tolerating adequate PO intake  - Continue Zosyn, PPI  - Continue pain control as needed  - Encourage OOB/ambulation with assistance  - Trend LFTs, awaiting AM labs  - DVT Prophylaxis with SCDs  - Above to be discussed with Dr. Oropeza (covering for Dr. Marquez)    Surgical Team  Spectralink: [id number] - GI & medicine consults appreciated, questionable PUD  - Continue CLD, IVF until tolerating adequate PO intake  - Continue Zosyn, PPI  - Continue pain control as needed  - Encourage OOB/ambulation with assistance  - Trend LFTs, awaiting AM labs  - DVT Prophylaxis with SCDs  - Above to be discussed with Dr. Oropeza (covering for Dr. Marquez)    Surgical Team  Spectralink: [id number]

## 2022-07-30 NOTE — PROGRESS NOTE ADULT - SUBJECTIVE AND OBJECTIVE BOX
Patient is a 57y old  Female who presents with a chief complaint of Abdominal pain, found to have cholelithiasis       Subjective:  INTERVAL HPI/OVERNIGHT EVENTS: Patient seen and examined at bedside. No overnight events occurred. Patient has some residual RUQ pain and tenderness to palpation. Denies fevers, chills, headache, lightheadedness, chest pain, dyspnea,  n/v/d/c.    MEDICATIONS  (STANDING):  lactated ringers. 1000 milliLiter(s) (100 mL/Hr) IV Continuous <Continuous>  pantoprazole    Tablet 40 milliGRAM(s) Oral every 12 hours  piperacillin/tazobactam IVPB.. 3.375 Gram(s) IV Intermittent every 8 hours  polyethylene glycol 3350 17 Gram(s) Oral daily  potassium chloride    Tablet ER 40 milliEquivalent(s) Oral every 4 hours  potassium chloride  10 mEq/100 mL IVPB 10 milliEquivalent(s) IV Intermittent every 1 hour  sucralfate suspension 1 Gram(s) Oral four times a day    MEDICATIONS  (PRN):  HYDROmorphone  Injectable 0.5 milliGRAM(s) IV Push every 4 hours PRN Moderate Pain (4 - 6)  HYDROmorphone  Injectable 1 milliGRAM(s) IV Push every 4 hours PRN Severe Pain (7 - 10)  ketorolac   Injectable 15 milliGRAM(s) IV Push every 6 hours PRN Mild Pain (1 - 3)      Allergies    No Known Allergies        REVIEW OF SYSTEMS:  CONSTITUTIONAL: No fever or chills  HEENT:  No headache, no sore throat  RESPIRATORY: No cough, wheezing, or shortness of breath  CARDIOVASCULAR: No chest pain, palpitations  GASTROINTESTINAL: + abd pain, no nausea, vomiting, or diarrhea  GENITOURINARY: No dysuria, frequency, or hematuria  NEUROLOGICAL: no focal weakness or dizziness  MUSCULOSKELETAL: no myalgias     Objective:  Vital Signs Last 24 Hrs  T(C): 37 (30 Jul 2022 12:09), Max: 37.4 (29 Jul 2022 16:46)  T(F): 98.6 (30 Jul 2022 12:09), Max: 99.3 (29 Jul 2022 16:46)  HR: 78 (30 Jul 2022 12:09) (78 - 87)  BP: 149/81 (30 Jul 2022 12:09) (143/83 - 171/93)  BP(mean): --  RR: 16 (30 Jul 2022 12:09) (16 - 18)  SpO2: 92% (30 Jul 2022 12:09) (91% - 98%)  Patient On (Oxygen Delivery Method): room air      GENERAL: NAD, lying in bed comfortably  HEAD:  Atraumatic, Normocephalic  EYES: EOMI, PERRLA, conjunctiva and sclera clear  ENT: Moist mucous membranes  NECK: Supple, No JVD  CHEST/LUNG: Clear to auscultation bilaterally; No rales, rhonchi, wheezing, or rubs. Unlabored respirations  HEART: Regular rate and rhythm; No murmurs, rubs, or gallops  ABDOMEN: Bowel sounds present; Soft, + tender in RUQ  EXTREMITIES:  2+ Peripheral Pulses, brisk capillary refill. No clubbing, cyanosis, or edema  MSK: FROM all 4 extremities, full and equal strength  SKIN: No rashes or lesions    LABS:                        11.2   7.91  )-----------( 391      ( 30 Jul 2022 09:19 )             35.5     CBC Full  -  ( 30 Jul 2022 09:19 )  WBC Count : 7.91 K/uL  Hemoglobin : 11.2 g/dL  Hematocrit : 35.5 %  Platelet Count - Automated : 391 K/uL  Mean Cell Volume : 72.9 fl  Mean Cell Hemoglobin : 23.0 pg  Mean Cell Hemoglobin Concentration : 31.5 gm/dL      30 Jul 2022 09:19    139    |  105    |  5      ----------------------------<  160    3.0     |  23     |  0.74     Ca    8.9        30 Jul 2022 09:19    TPro  7.7    /  Alb  3.0    /  TBili  0.6    /  DBili  x      /  AST  20     /  ALT  69     /  AlkPhos  167    30 Jul 2022 09:19    PT/INR - ( 29 Jul 2022 12:46 )   PT: 12.6 sec;   INR: 1.08 ratio         PTT - ( 28 Jul 2022 21:15 )  PTT:35.7 sec    CAPILLARY BLOOD GLUCOSE              RADIOLOGY & ADDITIONAL TESTS:    CT Abdomen    IMPRESSION:    1.8 cm intrapelvic renal calcification with mild pelviectasis.    Nonobstructing left intrarenal calculus.    Other findings as discussed above.    Consultant(s) Notes Reviewed:  [x] YES  [ ] NO

## 2022-07-30 NOTE — PROGRESS NOTE ADULT - ASSESSMENT
57 year old female with PMH HTN, HLD, s/p hysterectomy, here with cholelithiasis, possible choledocholithiasis, r/o acute cholecystitis.

## 2022-07-30 NOTE — PROGRESS NOTE ADULT - SUBJECTIVE AND OBJECTIVE BOX
[facility]  Austin Chan PA-C  [address] [place] [address]  [phone]      INTERVAL HPI/OVERNIGHT EVENTS:  Overnight events noted  CT noted    MEDICATIONS  (STANDING):  lactated ringers. 1000 milliLiter(s) (100 mL/Hr) IV Continuous <Continuous>  pantoprazole    Tablet 40 milliGRAM(s) Oral every 12 hours  piperacillin/tazobactam IVPB.. 3.375 Gram(s) IV Intermittent every 8 hours  polyethylene glycol 3350 17 Gram(s) Oral daily  potassium chloride  10 mEq/100 mL IVPB 10 milliEquivalent(s) IV Intermittent every 1 hour  sucralfate suspension 1 Gram(s) Oral four times a day    MEDICATIONS  (PRN):  HYDROmorphone  Injectable 0.5 milliGRAM(s) IV Push every 4 hours PRN Moderate Pain (4 - 6)  HYDROmorphone  Injectable 1 milliGRAM(s) IV Push every 4 hours PRN Severe Pain (7 - 10)  ketorolac   Injectable 15 milliGRAM(s) IV Push every 6 hours PRN Mild Pain (1 - 3)      Allergies    No Known Allergies    Intolerances        PHYSICAL EXAM:   Vital Signs:  Vital Signs Last 24 Hrs  T(C): 37.1 (2022 04:18), Max: 37.4 (2022 16:46)  T(F): 98.8 (2022 04:18), Max: 99.3 (2022 16:46)  HR: 82 (2022 04:18) (80 - 87)  BP: 164/92 (2022 04:18) (143/83 - 171/93)  BP(mean): --  RR: 18 (2022 04:18) (17 - 18)  SpO2: 93% (2022 04:18) (91% - 98%)    Parameters below as of 2022 04:18  Patient On (Oxygen Delivery Method): room air      Daily Height in cm: 165.1 (2022 15:22)    Daily Weight in k.3 (2022 04:18)    GENERAL:  Appears stated age  ABDOMEN:  Soft, non-tender, non-distended  NEURO:  Alert      LABS:                        11.2   7.91  )-----------( 391      ( 2022 09:19 )             35.5         139  |  105  |  5<L>  ----------------------------<  160<H>  3.0<L>   |  23  |  0.74    Ca    8.9      2022 09:19    TPro  7.7  /  Alb  3.0<L>  /  TBili  0.6  /  DBili  x   /  AST  20  /  ALT  69  /  AlkPhos  167<H>      PT/INR - ( 2022 12:46 )   PT: 12.6 sec;   INR: 1.08 ratio         PTT - ( 2022 21:15 )  PTT:35.7 sec      RADIOLOGY & ADDITIONAL TESTS:  < from: CT Abdomen and Pelvis w/ IV Cont (22 @ 15:49) >    ACC: [id number] EXAM:  CT ABDOMEN AND PELVIS IC                          PROCEDURE DATE:  2022          INTERPRETATION:  CLINICAL INFORMATION: Epigastric abdominal pain.    COMPARISON: Right upper quadrant ultrasound 2022.    CONTRAST/COMPLICATIONS:  IV Contrast: Omnipaque 350  90 cc administered   10 cc discarded  Oral Contrast: NONE  Complications: None reported at time of study completion    PROCEDURE:  CT of the Abdomen and Pelvis was performed.  Sagittal and coronal reformats wereperformed.    FINDINGS:    LOWER CHEST: Within normal limits.    LIVER: Within normal limits.  BILE DUCTS: Normal caliber.  GALLBLADDER: No gallbladder wall thickening.  SPLEEN: Within normal limits.  PANCREAS: Within normal limits.  ADRENALS: Withinnormal limits.  KIDNEYS/URETERS:  1.8 cm right intrapelvic calcification with mild pelviectasis.    5 mm nonobstructing intrarenal calcification mid to lower pole left   kidney.    BLADDER: Within normal limits.  REPRODUCTIVE ORGANS: Hysterectomy.    BOWEL:  The evaluation of the stomach is limited without distention.  No bowel obstruction.   Appendix within normal limits.  PERITONEUM: No ascites.    VESSELS: Atherosclerotic changes.  RETROPERITONEUM/LYMPH NODES: No lymphadenopathy.    ABDOMINAL WALL:  Small fat-containing umbilical hernia.    BONES:  Sclerosis surrounding bilateral sacroiliac joints.    IMPRESSION:    1.8 cm intrapelvic renal calcification with mild pelviectasis.    Nonobstructing left intrarenal calculus.    Other findings as discussed above.    --- End of Report ---            YI TODD MD; Attending Radiologist  This document has been electronically signed. 2022  4:35PM    < end of copied text >   [facility]  Austin Chan PA-C  [address] [place] [id number] [phone]      INTERVAL HPI/OVERNIGHT EVENTS:  German  ID #[id number]  Overnight events noted  Mostly having right sided back pain now but still in RUQ/epigastric region  CT noted    MEDICATIONS  (STANDING):  lactated ringers. 1000 milliLiter(s) (100 mL/Hr) IV Continuous <Continuous>  pantoprazole    Tablet 40 milliGRAM(s) Oral every 12 hours  piperacillin/tazobactam IVPB.. 3.375 Gram(s) IV Intermittent every 8 hours  polyethylene glycol 3350 17 Gram(s) Oral daily  potassium chloride  10 mEq/100 mL IVPB 10 milliEquivalent(s) IV Intermittent every 1 hour  sucralfate suspension 1 Gram(s) Oral four times a day    MEDICATIONS  (PRN):  HYDROmorphone  Injectable 0.5 milliGRAM(s) IV Push every 4 hours PRN Moderate Pain (4 - 6)  HYDROmorphone  Injectable 1 milliGRAM(s) IV Push every 4 hours PRN Severe Pain (7 - 10)  ketorolac   Injectable 15 milliGRAM(s) IV Push every 6 hours PRN Mild Pain (1 - 3)      Allergies  No Known Allergies    PHYSICAL EXAM:   Vital Signs:  Vital Signs Last 24 Hrs  T(C): 37.1 (2022 04:18), Max: 37.4 (2022 16:46)  T(F): 98.8 (2022 04:18), Max: 99.3 (2022 16:46)  HR: 82 (2022 04:18) (80 - 87)  BP: 164/92 (2022 04:18) (143/83 - 171/93)  BP(mean): --  RR: 18 (2022 04:18) (17 - 18)  SpO2: 93% (2022 04:18) (91% - 98%)    Parameters below as of 2022 04:18  Patient On (Oxygen Delivery Method): room air      Daily Height in cm: 165.1 (2022 15:22)    Daily Weight in k.3 (2022 04:18)    GENERAL:  Appears stated age  ABDOMEN:  Soft, non-tender, non-distended  NEURO:  Alert      LABS:                        11.2   7.91  )-----------( 391      ( 2022 09:19 )             35.5         139  |  105  |  5<L>  ----------------------------<  160<H>  3.0<L>   |  23  |  0.74    Ca    8.9      2022 09:19    TPro  7.7  /  Alb  3.0<L>  /  TBili  0.6  /  DBili  x   /  AST  20  /  ALT  69  /  AlkPhos  167<H>      PT/INR - ( 2022 12:46 )   PT: 12.6 sec;   INR: 1.08 ratio         PTT - ( 2022 21:15 )  PTT:35.7 sec      RADIOLOGY & ADDITIONAL TESTS:  < from: CT Abdomen and Pelvis w/ IV Cont (22 @ 15:49) >    ACC: [id number] EXAM:  CT ABDOMEN AND PELVIS IC                          PROCEDURE DATE:  2022          INTERPRETATION:  CLINICAL INFORMATION: Epigastric abdominal pain.    COMPARISON: Right upper quadrant ultrasound 2022.    CONTRAST/COMPLICATIONS:  IV Contrast: Omnipaque 350  90 cc administered   10 cc discarded  Oral Contrast: NONE  Complications: None reported at time of study completion    PROCEDURE:  CT of the Abdomen and Pelvis was performed.  Sagittal and coronal reformats wereperformed.    FINDINGS:    LOWER CHEST: Within normal limits.    LIVER: Within normal limits.  BILE DUCTS: Normal caliber.  GALLBLADDER: No gallbladder wall thickening.  SPLEEN: Within normal limits.  PANCREAS: Within normal limits.  ADRENALS: Withinnormal limits.  KIDNEYS/URETERS:  1.8 cm right intrapelvic calcification with mild pelviectasis.    5 mm nonobstructing intrarenal calcification mid to lower pole left   kidney.    BLADDER: Within normal limits.  REPRODUCTIVE ORGANS: Hysterectomy.    BOWEL:  The evaluation of the stomach is limited without distention.  No bowel obstruction.   Appendix within normal limits.  PERITONEUM: No ascites.    VESSELS: Atherosclerotic changes.  RETROPERITONEUM/LYMPH NODES: No lymphadenopathy.    ABDOMINAL WALL:  Small fat-containing umbilical hernia.    BONES:  Sclerosis surrounding bilateral sacroiliac joints.    IMPRESSION:    1.8 cm intrapelvic renal calcification with mild pelviectasis.    Nonobstructing left intrarenal calculus.    Other findings as discussed above.    --- End of Report ---            YI TODD MD; Attending Radiologist  This document has been electronically signed. 2022  4:35PM    < end of copied text >

## 2022-07-31 ENCOUNTER — TRANSCRIPTION ENCOUNTER (OUTPATIENT)
Age: 57
End: 2022-07-31

## 2022-07-31 VITALS
RESPIRATION RATE: 19 BRPM | OXYGEN SATURATION: 95 % | TEMPERATURE: 99 F | HEART RATE: 73 BPM | DIASTOLIC BLOOD PRESSURE: 80 MMHG | SYSTOLIC BLOOD PRESSURE: 121 MMHG

## 2022-07-31 DIAGNOSIS — R93.89 ABNORMAL FINDINGS ON DIAGNOSTIC IMAGING OF OTHER SPECIFIED BODY STRUCTURES: ICD-10-CM

## 2022-07-31 LAB
ALBUMIN SERPL ELPH-MCNC: 3 G/DL — LOW (ref 3.3–5)
ALP SERPL-CCNC: 192 U/L — HIGH (ref 40–120)
ALT FLD-CCNC: 60 U/L — SIGNIFICANT CHANGE UP (ref 12–78)
ANION GAP SERPL CALC-SCNC: 9 MMOL/L — SIGNIFICANT CHANGE UP (ref 5–17)
AST SERPL-CCNC: 24 U/L — SIGNIFICANT CHANGE UP (ref 15–37)
BASOPHILS # BLD AUTO: 0.02 K/UL — SIGNIFICANT CHANGE UP (ref 0–0.2)
BASOPHILS NFR BLD AUTO: 0.4 % — SIGNIFICANT CHANGE UP (ref 0–2)
BILIRUB SERPL-MCNC: 0.4 MG/DL — SIGNIFICANT CHANGE UP (ref 0.2–1.2)
BUN SERPL-MCNC: 5 MG/DL — LOW (ref 7–23)
CALCIUM SERPL-MCNC: 8.9 MG/DL — SIGNIFICANT CHANGE UP (ref 8.5–10.1)
CHLORIDE SERPL-SCNC: 111 MMOL/L — HIGH (ref 96–108)
CHOLEST SERPL-MCNC: 232 MG/DL — HIGH
CO2 SERPL-SCNC: 23 MMOL/L — SIGNIFICANT CHANGE UP (ref 22–31)
CREAT SERPL-MCNC: 0.59 MG/DL — SIGNIFICANT CHANGE UP (ref 0.5–1.3)
EGFR: 104 ML/MIN/1.73M2 — SIGNIFICANT CHANGE UP
EOSINOPHIL # BLD AUTO: 0.28 K/UL — SIGNIFICANT CHANGE UP (ref 0–0.5)
EOSINOPHIL NFR BLD AUTO: 5 % — SIGNIFICANT CHANGE UP (ref 0–6)
GLUCOSE SERPL-MCNC: 90 MG/DL — SIGNIFICANT CHANGE UP (ref 70–99)
HCT VFR BLD CALC: 33.4 % — LOW (ref 34.5–45)
HDLC SERPL-MCNC: 31 MG/DL — LOW
HGB BLD-MCNC: 10.4 G/DL — LOW (ref 11.5–15.5)
IMM GRANULOCYTES NFR BLD AUTO: 0.2 % — SIGNIFICANT CHANGE UP (ref 0–1.5)
LIPID PNL WITH DIRECT LDL SERPL: 166 MG/DL — HIGH
LYMPHOCYTES # BLD AUTO: 1.92 K/UL — SIGNIFICANT CHANGE UP (ref 1–3.3)
LYMPHOCYTES # BLD AUTO: 34.1 % — SIGNIFICANT CHANGE UP (ref 13–44)
MCHC RBC-ENTMCNC: 22.9 PG — LOW (ref 27–34)
MCHC RBC-ENTMCNC: 31.1 GM/DL — LOW (ref 32–36)
MCV RBC AUTO: 73.4 FL — LOW (ref 80–100)
MONOCYTES # BLD AUTO: 0.55 K/UL — SIGNIFICANT CHANGE UP (ref 0–0.9)
MONOCYTES NFR BLD AUTO: 9.8 % — SIGNIFICANT CHANGE UP (ref 2–14)
NEUTROPHILS # BLD AUTO: 2.85 K/UL — SIGNIFICANT CHANGE UP (ref 1.8–7.4)
NEUTROPHILS NFR BLD AUTO: 50.5 % — SIGNIFICANT CHANGE UP (ref 43–77)
NON HDL CHOLESTEROL: 201 MG/DL — HIGH
NRBC # BLD: 0 /100 WBCS — SIGNIFICANT CHANGE UP (ref 0–0)
PLATELET # BLD AUTO: 400 K/UL — SIGNIFICANT CHANGE UP (ref 150–400)
POTASSIUM SERPL-MCNC: 3.7 MMOL/L — SIGNIFICANT CHANGE UP (ref 3.5–5.3)
POTASSIUM SERPL-SCNC: 3.7 MMOL/L — SIGNIFICANT CHANGE UP (ref 3.5–5.3)
PROT SERPL-MCNC: 7.4 G/DL — SIGNIFICANT CHANGE UP (ref 6–8.3)
RBC # BLD: 4.55 M/UL — SIGNIFICANT CHANGE UP (ref 3.8–5.2)
RBC # FLD: 15.9 % — HIGH (ref 10.3–14.5)
SODIUM SERPL-SCNC: 143 MMOL/L — SIGNIFICANT CHANGE UP (ref 135–145)
TRIGL SERPL-MCNC: 176 MG/DL — HIGH
WBC # BLD: 5.63 K/UL — SIGNIFICANT CHANGE UP (ref 3.8–10.5)
WBC # FLD AUTO: 5.63 K/UL — SIGNIFICANT CHANGE UP (ref 3.8–10.5)

## 2022-07-31 PROCEDURE — 99232 SBSQ HOSP IP/OBS MODERATE 35: CPT

## 2022-07-31 RX ORDER — SUCRALFATE 1 G
10 TABLET ORAL
Qty: 1200 | Refills: 1
Start: 2022-07-31

## 2022-07-31 RX ORDER — PANTOPRAZOLE SODIUM 20 MG/1
1 TABLET, DELAYED RELEASE ORAL
Qty: 60 | Refills: 1
Start: 2022-07-31

## 2022-07-31 RX ADMIN — PANTOPRAZOLE SODIUM 40 MILLIGRAM(S): 20 TABLET, DELAYED RELEASE ORAL at 16:00

## 2022-07-31 RX ADMIN — Medication 1 GRAM(S): at 16:00

## 2022-07-31 RX ADMIN — PIPERACILLIN AND TAZOBACTAM 25 GRAM(S): 4; .5 INJECTION, POWDER, LYOPHILIZED, FOR SOLUTION INTRAVENOUS at 06:12

## 2022-07-31 RX ADMIN — Medication 1 GRAM(S): at 06:12

## 2022-07-31 RX ADMIN — PANTOPRAZOLE SODIUM 40 MILLIGRAM(S): 20 TABLET, DELAYED RELEASE ORAL at 06:13

## 2022-07-31 RX ADMIN — PIPERACILLIN AND TAZOBACTAM 25 GRAM(S): 4; .5 INJECTION, POWDER, LYOPHILIZED, FOR SOLUTION INTRAVENOUS at 13:15

## 2022-07-31 RX ADMIN — Medication 1 GRAM(S): at 00:34

## 2022-07-31 RX ADMIN — Medication 1 GRAM(S): at 13:16

## 2022-07-31 RX ADMIN — AMLODIPINE BESYLATE 10 MILLIGRAM(S): 2.5 TABLET ORAL at 06:13

## 2022-07-31 RX ADMIN — POLYETHYLENE GLYCOL 3350 17 GRAM(S): 17 POWDER, FOR SOLUTION ORAL at 13:16

## 2022-07-31 NOTE — PROGRESS NOTE ADULT - PROBLEM SELECTOR PLAN 5
Per pt daughter, patient is prescribed 10mg Prednisone unclear if patient takes this. Does not seem to take daily steroids, but will consider potential adrenal insufficiency if patient has sudden hypotension

## 2022-07-31 NOTE — PROGRESS NOTE ADULT - SUBJECTIVE AND OBJECTIVE BOX
[facility]  Austin Chan PA-C  [address] [place], [place] [address] [phone]      INTERVAL HPI/OVERNIGHT EVENTS:  Overnight events noted  Abdominal pain improving today  CT noted    MEDICATIONS  (STANDING):  amLODIPine   Tablet 10 milliGRAM(s) Oral daily  lactated ringers. 1000 milliLiter(s) (100 mL/Hr) IV Continuous <Continuous>  pantoprazole    Tablet 40 milliGRAM(s) Oral every 12 hours  piperacillin/tazobactam IVPB.. 3.375 Gram(s) IV Intermittent every 8 hours  polyethylene glycol 3350 17 Gram(s) Oral daily  sucralfate suspension 1 Gram(s) Oral four times a day    MEDICATIONS  (PRN):  HYDROmorphone  Injectable 0.5 milliGRAM(s) IV Push every 4 hours PRN Moderate Pain (4 - 6)  HYDROmorphone  Injectable 1 milliGRAM(s) IV Push every 4 hours PRN Severe Pain (7 - 10)  ketorolac   Injectable 15 milliGRAM(s) IV Push every 6 hours PRN Mild Pain (1 - 3)      Allergies  No Known Allergies      PHYSICAL EXAM:   Vital Signs:  Vital Signs Last 24 Hrs  T(C): 36.9 (2022 04:40), Max: 37 (2022 12:09)  T(F): 98.5 (2022 04:40), Max: 98.6 (2022 12:09)  HR: 89 (2022 04:40) (78 - 89)  BP: 151/88 (2022 04:40) (123/82 - 151/88)  BP(mean): --  RR: 19 (2022 04:40) (16 - 19)  SpO2: 97% (2022 04:40) (92% - 97%)    Parameters below as of 2022 04:40  Patient On (Oxygen Delivery Method): room air      Daily     Daily Weight in k.3 (2022 04:40)    GENERAL:  Appears stated age  ABDOMEN:  Soft, non-tender, non-distended  NEURO:  Alert    LABS:                        10.4   5.63  )-----------( 400      ( 2022 08:18 )             33.4         143  |  111<H>  |  5<L>  ----------------------------<  90  3.7   |  23  |  0.59    Ca    8.9      2022 08:18    TPro  7.4  /  Alb  3.0<L>  /  TBili  0.4  /  DBili  x   /  AST  24  /  ALT  60  /  AlkPhos  192<H>      PT/INR - ( 2022 12:46 )   PT: 12.6 sec;   INR: 1.08 ratio

## 2022-07-31 NOTE — PROGRESS NOTE ADULT - ASSESSMENT
abdominal pain  elevated lfts    lfts improved  hida negative  ?PUD, PPI and carafate  CT noted, gallbladder and bile ducts within normal limits  Will follow    I reviewed the overnight course of events on the unit, re-confirming the patient history. I discussed the care with the patient and their family  Differential diagnosis and plan of care discussed with patient after the evaluation  35 minutes spent on total encounter of which more than fifty percent of the encounter was spent counseling and/or coordinating care by the attending physician.  Advanced care planning was discussed with patient and family.  Advanced care planning forms were reviewed and discussed.  Risks, benefits and alternatives of gastroenterologic procedures were discussed in detail and all questions were answered.

## 2022-07-31 NOTE — PROGRESS NOTE ADULT - PROBLEM SELECTOR PLAN 4
Per pt daughter, patient is prescribed 20mg protonix daily, but she only takes it 1x week as needed   - patient already receiving protonix, will hold PO
Per pt daughter, patient is prescribed 20mg protonix daily, but she only takes it 1x week as needed   - patient already receiving protonix, will hold PO

## 2022-07-31 NOTE — PROGRESS NOTE ADULT - NS ATTEND AMEND GEN_ALL_CORE FT
I have personally seen and examined the patient.  I fully participated in the care of this patient.  I have made amendments to the documentation where necessary, and agree with the history, physical exam, impression/assessment, and plan as documented by the PA    patient advanced to lowfat diet yesterday, tolerating without nausea/vomiting or return of abdominal pain. Patient reports she is feeling better. Possible biliary colic with passed stone, cannot r/o PUD. Ok for d/c from surgical perspective with outpatient f/u with GI and surgery Dr. Marquez. Continue ppi and carafate after discharge.
I have personally seen and examined the patient.  I fully participated in the care of this patient.  I have made amendments to the documentation where necessary, and agree with the history, physical exam, impression/assessment, and plan as documented by the PA    Patient's pain improved. Patient likely experienced biliary colic and passed a stone as pain was brought on after eating fried food and resolved with bowel rest. LFTs downtrending. Imaging including US, CT, HIDA reviewed. Advance diet to lowfat (vegetarian). GI f/u ?role for endoscopy to r/o PUD.

## 2022-07-31 NOTE — PROGRESS NOTE ADULT - TIME BILLING
Chart review, examination, documentation, care coordination and counseling.  DC planning per surgery.
Pt seen + examined. Case discussed with resident. Agree with assessment and plan above (edited by me in detail):  Time spent: 45min. More than 50% of the visit was spent counseling the patient on medical condition --

## 2022-07-31 NOTE — PROGRESS NOTE ADULT - PROBLEM SELECTOR PLAN 3
Per pt daughter, patient is prescribed 134mg Fenofibrate daily, questionable compliance   Will hold for now  f/u lipid panel in AM
Per pt daughter, patient is prescribed 134mg Fenofibrate daily, questionable compliance   Will hold for now  f/u lipid panel in AM

## 2022-07-31 NOTE — DISCHARGE NOTE NURSING/CASE MANAGEMENT/SOCIAL WORK - PATIENT PORTAL LINK FT
You can access the FollowMyHealth Patient Portal offered by [facility] by registering at the following website: [email or web address]. By joining [facility]’s FollowMyHealth portal, you will also be able to view your health information using other applications (apps) compatible with our system.

## 2022-07-31 NOTE — PROGRESS NOTE ADULT - PROBLEM SELECTOR PLAN 2
Per pt daughter, patient is prescribed 10mg Amlodipine daily, questionable compliance   amlodipine ordered today, AM BP  164/92  will continue to monitor
Per pt daughter, patient is prescribed 10mg Amlodipine daily, questionable compliance   amlodipine ordered today, AM BP  164/92  will continue to monitor

## 2022-07-31 NOTE — PROGRESS NOTE ADULT - SUBJECTIVE AND OBJECTIVE BOX
Patient is a 57y old  Female who presents with a chief complaint of Abdominal pain (30 Jul 2022 13:19)    INTERVAL HPI/OVERNIGHT EVENTS: Patient seen and examined at bedside. No overnight events occurred. Denies fevers, chills, headache, lightheadedness, chest pain, dyspnea, abdominal pain, n/v/d/c.  [facility]  #[id number]    MEDICATIONS  (STANDING):  amLODIPine   Tablet 10 milliGRAM(s) Oral daily  lactated ringers. 1000 milliLiter(s) (100 mL/Hr) IV Continuous <Continuous>  pantoprazole    Tablet 40 milliGRAM(s) Oral every 12 hours  piperacillin/tazobactam IVPB.. 3.375 Gram(s) IV Intermittent every 8 hours  polyethylene glycol 3350 17 Gram(s) Oral daily  sucralfate suspension 1 Gram(s) Oral four times a day    MEDICATIONS  (PRN):  HYDROmorphone  Injectable 0.5 milliGRAM(s) IV Push every 4 hours PRN Moderate Pain (4 - 6)  HYDROmorphone  Injectable 1 milliGRAM(s) IV Push every 4 hours PRN Severe Pain (7 - 10)  ketorolac   Injectable 15 milliGRAM(s) IV Push every 6 hours PRN Mild Pain (1 - 3)    Allergies  No Known Allergies  Intolerances    REVIEW OF SYSTEMS:  CONSTITUTIONAL: No fever or chills  HEENT:  No headache, no sore throat  RESPIRATORY: No cough, wheezing, or shortness of breath  CARDIOVASCULAR: No chest pain, palpitations  GASTROINTESTINAL: No abd pain, nausea, vomiting, or diarrhea  GENITOURINARY: No dysuria, frequency, or hematuria  NEUROLOGICAL: no focal weakness or dizziness  MUSCULOSKELETAL: no myalgias     Vital Signs Last 24 Hrs  T(C): 36.9 (31 Jul 2022 04:40), Max: 37 (30 Jul 2022 12:09)  T(F): 98.5 (31 Jul 2022 04:40), Max: 98.6 (30 Jul 2022 12:09)  HR: 89 (31 Jul 2022 04:40) (78 - 89)  BP: 151/88 (31 Jul 2022 04:40) (123/82 - 151/88)  BP(mean): --  RR: 19 (31 Jul 2022 04:40) (16 - 19)  SpO2: 97% (31 Jul 2022 04:40) (92% - 97%)    Parameters below as of 31 Jul 2022 04:40  Patient On (Oxygen Delivery Method): room air      PHYSICAL EXAM:  GENERAL: NAD  HEENT:  anicteric, moist mucous membranes  CHEST/LUNG:  CTA b/l, no rales, wheezes, or rhonchi  HEART:  RRR, S1, S2  ABDOMEN:  BS+, soft,  faintly tender in RUQ, distended  EXTREMITIES: no edema, cyanosis, or calf tenderness  NERVOUS SYSTEM: answers questions and follows commands appropriately    LABS:             11.2   7.91  )-----------( 391      ( 30 Jul 2022 09:19 )             35.5     CBC Full  -  ( 30 Jul 2022 09:19 )  WBC Count : 7.91 K/uL  Hemoglobin : 11.2 g/dL  Hematocrit : 35.5 %  Platelet Count - Automated : 391 K/uL  Mean Cell Volume : 72.9 fl  Mean Cell Hemoglobin : 23.0 pg  Mean Cell Hemoglobin Concentration : 31.5 gm/dL    30 Jul 2022 09:19    139    |  105    |  5      ----------------------------<  160    3.0     |  23     |  0.74     Ca    8.9        30 Jul 2022 09:19    TPro  7.7    /  Alb  3.0    /  TBili  0.6    /  DBili  x      /  AST  20     /  ALT  69     /  AlkPhos  167    30 Jul 2022 09:19    PT/INR - ( 29 Jul 2022 12:46 )   PT: 12.6 sec;   INR: 1.08 ratio      CAPILLARY BLOOD GLUCOSE    RADIOLOGY & ADDITIONAL TESTS:  Personally reviewed.   Consultant(s) Notes Reviewed:  [x] YES  [ ] NO

## 2022-07-31 NOTE — DISCHARGE NOTE NURSING/CASE MANAGEMENT/SOCIAL WORK - NSDCPEFALRISK_GEN_ALL_CORE
For information on Fall & Injury Prevention, visit: [email or web address] OR  [email or web address] OR  https://www.cdc.gov/steadi/patient.html

## 2022-07-31 NOTE — PROGRESS NOTE ADULT - PROBLEM SELECTOR PLAN 1
- presented with abd pain, w/u in progress . possible PUD   - Admitted to surgical service  - diet advanced  - pain improving, on dilaudid  - IV Zosyn   - HIDA negative   - Continue to trend LFTs and clinical status  - GI and primary surgery following

## 2022-07-31 NOTE — PROGRESS NOTE ADULT - SUBJECTIVE AND OBJECTIVE BOX
SURGERY PA NOTE ON BEHALF OF DR. OROPEZA (COVERING FOR DR. ALLISON) / GENERAL SURGERY:    S: Patient seen and examined at bedside.  No overnight events noted.  Currently, resting comfortably - denies any c/o abdominal pain, N/V.  Tolerating diet without post-prandial complaints.  Admits to BM and F.  Urinating without issue.          MEDICATIONS:  amLODIPine   Tablet 10 milliGRAM(s) Oral daily  HYDROmorphone  Injectable 0.5 milliGRAM(s) IV Push every 4 hours PRN  HYDROmorphone  Injectable 1 milliGRAM(s) IV Push every 4 hours PRN  ketorolac   Injectable 15 milliGRAM(s) IV Push every 6 hours PRN  lactated ringers. 1000 milliLiter(s) IV Continuous <Continuous>  pantoprazole    Tablet 40 milliGRAM(s) Oral every 12 hours  piperacillin/tazobactam IVPB.. 3.375 Gram(s) IV Intermittent every 8 hours  polyethylene glycol 3350 17 Gram(s) Oral daily  sucralfate suspension 1 Gram(s) Oral four times a day    O:  Vital Signs Last 24 Hrs  T(C): 36.9 (31 Jul 2022 04:40), Max: 37 (30 Jul 2022 12:09)  T(F): 98.5 (31 Jul 2022 04:40), Max: 98.6 (30 Jul 2022 12:09)  HR: 89 (31 Jul 2022 04:40) (78 - 89)  BP: 151/88 (31 Jul 2022 04:40) (123/82 - 151/88)  RR: 19 (31 Jul 2022 04:40) (16 - 19)  SpO2: 97% (31 Jul 2022 04:40) (92% - 97%)  Parameters below as of 31 Jul 2022 04:40  Patient On (Oxygen Delivery Method): room air    PHYSICAL EXAM:  Lungs: CTA bilat without W/R/R  Card: S1S2  Abd: Soft, ND.  Minimal tenderness to deep palpation at the RUQ and epigastrium. +BS x 4.  No rebound/guarding.    Ext: Calves soft, NT, without edema bilat    LABS:                        11.2   7.91  )-----------( 391      ( 30 Jul 2022 09:19 )             35.5     07-30    139  |  105  |  5<L>  ----------------------------<  160<H>  3.0<L>   |  23  |  0.74    Ca    8.9      30 Jul 2022 09:19    TPro  7.7  /  Alb  3.0<L>  /  TBili  0.6  /  DBili  x   /  AST  20  /  ALT  69  /  AlkPhos  167<H>  07-30    PT/INR - ( 29 Jul 2022 12:46 )   PT: 12.6 sec;   INR: 1.08 ratio        A:  57 year old female with PMH HTN, HLD, s/p hysterectomy,   Workup elucidates cholelithiasis, without evidence thus far (on CT & HIDA) for acute cholecystitis     P:  Patient reports improvement in abdominal pain   Tolerating diet without excerbation of symptoms  Vitals stable and reassuring, remains afebrile   Relatively benign abdominal exam noted  Labwork still pending for today...  Suspect passed stone, cannot r/o PUD as underlying cause for symptoms - will d/w GI   Will d/w Dr. Oropeza as well, will follow      SURGERY PA NOTE ON BEHALF OF DR. OROPEZA (COVERING FOR DR. ALLISON) / GENERAL SURGERY:    S: Patient seen and examined at bedside.  No overnight events noted.  Currently, resting comfortably - denies any c/o abdominal pain, N/V.  Tolerating diet without post-prandial complaints.  Admits to BM and F.  Urinating without issue.          MEDICATIONS:  amLODIPine   Tablet 10 milliGRAM(s) Oral daily  HYDROmorphone  Injectable 0.5 milliGRAM(s) IV Push every 4 hours PRN  HYDROmorphone  Injectable 1 milliGRAM(s) IV Push every 4 hours PRN  ketorolac   Injectable 15 milliGRAM(s) IV Push every 6 hours PRN  lactated ringers. 1000 milliLiter(s) IV Continuous <Continuous>  pantoprazole    Tablet 40 milliGRAM(s) Oral every 12 hours  piperacillin/tazobactam IVPB.. 3.375 Gram(s) IV Intermittent every 8 hours  polyethylene glycol 3350 17 Gram(s) Oral daily  sucralfate suspension 1 Gram(s) Oral four times a day    O:  Vital Signs Last 24 Hrs  T(C): 36.9 (31 Jul 2022 04:40), Max: 37 (30 Jul 2022 12:09)  T(F): 98.5 (31 Jul 2022 04:40), Max: 98.6 (30 Jul 2022 12:09)  HR: 89 (31 Jul 2022 04:40) (78 - 89)  BP: 151/88 (31 Jul 2022 04:40) (123/82 - 151/88)  RR: 19 (31 Jul 2022 04:40) (16 - 19)  SpO2: 97% (31 Jul 2022 04:40) (92% - 97%)  Parameters below as of 31 Jul 2022 04:40  Patient On (Oxygen Delivery Method): room air    PHYSICAL EXAM:  General: No acute distress  Neuro: Awake and alert  Lungs: CTA bilat without W/R/R  Card: S1S2  Abd: Soft, ND.  Minimal tenderness to deep palpation at the RUQ and epigastrium. +BS x 4.  No rebound/guarding.    Ext: Calves soft, NT, without edema bilat  Psych: affect appropriate    LABS:                        11.2   7.91  )-----------( 391      ( 30 Jul 2022 09:19 )             35.5     07-30    139  |  105  |  5<L>  ----------------------------<  160<H>  3.0<L>   |  23  |  0.74    Ca    8.9      30 Jul 2022 09:19    TPro  7.7  /  Alb  3.0<L>  /  TBili  0.6  /  DBili  x   /  AST  20  /  ALT  69  /  AlkPhos  167<H>  07-30    PT/INR - ( 29 Jul 2022 12:46 )   PT: 12.6 sec;   INR: 1.08 ratio        A:  57 year old female with PMH HTN, HLD, s/p hysterectomy,   Workup elucidates cholelithiasis, without evidence thus far (on CT & HIDA) for acute cholecystitis     P:  Patient reports improvement in abdominal pain   Tolerating diet without excerbation of symptoms  Vitals stable and reassuring, remains afebrile   Relatively benign abdominal exam noted  Labwork still pending for today...  Suspect passed stone, cannot r/o PUD as underlying cause for symptoms - will d/w GI   Will d/w Dr. Oropeza as well, will follow

## 2022-07-31 NOTE — PROGRESS NOTE ADULT - PROBLEM SELECTOR PLAN 6
CT imaging   ABDOMINAL WALL: Small fat-containing umbilical hernia.  BONES: Sclerosis surrounding bilateral sacroiliac joints.  1.8 cm intrapelvic renal calcification with mild pelviectasis.  Nonobstructing left intrarenal calculus.  *Outpatient follow up is encouraged. Should be on DC paper work.

## 2022-08-11 PROCEDURE — 71045 X-RAY EXAM CHEST 1 VIEW: CPT

## 2022-08-11 PROCEDURE — 87635 SARS-COV-2 COVID-19 AMP PRB: CPT

## 2022-08-11 PROCEDURE — 86850 RBC ANTIBODY SCREEN: CPT

## 2022-08-11 PROCEDURE — 86803 HEPATITIS C AB TEST: CPT

## 2022-08-11 PROCEDURE — 82553 CREATINE MB FRACTION: CPT

## 2022-08-11 PROCEDURE — 84484 ASSAY OF TROPONIN QUANT: CPT

## 2022-08-11 PROCEDURE — 93005 ELECTROCARDIOGRAM TRACING: CPT

## 2022-08-11 PROCEDURE — 86901 BLOOD TYPING SEROLOGIC RH(D): CPT

## 2022-08-11 PROCEDURE — 74177 CT ABD & PELVIS W/CONTRAST: CPT

## 2022-08-11 PROCEDURE — G1004: CPT

## 2022-08-11 PROCEDURE — A9537: CPT

## 2022-08-11 PROCEDURE — 36415 COLL VENOUS BLD VENIPUNCTURE: CPT

## 2022-08-11 PROCEDURE — 83690 ASSAY OF LIPASE: CPT

## 2022-08-11 PROCEDURE — 96374 THER/PROPH/DIAG INJ IV PUSH: CPT

## 2022-08-11 PROCEDURE — 99285 EMERGENCY DEPT VISIT HI MDM: CPT

## 2022-08-11 PROCEDURE — 96375 TX/PRO/DX INJ NEW DRUG ADDON: CPT

## 2022-08-11 PROCEDURE — 85027 COMPLETE CBC AUTOMATED: CPT

## 2022-08-11 PROCEDURE — 85730 THROMBOPLASTIN TIME PARTIAL: CPT

## 2022-08-11 PROCEDURE — 86900 BLOOD TYPING SEROLOGIC ABO: CPT

## 2022-08-11 PROCEDURE — 76705 ECHO EXAM OF ABDOMEN: CPT

## 2022-08-11 PROCEDURE — 80061 LIPID PANEL: CPT

## 2022-08-11 PROCEDURE — 80053 COMPREHEN METABOLIC PANEL: CPT

## 2022-08-11 PROCEDURE — 85025 COMPLETE CBC W/AUTO DIFF WBC: CPT

## 2022-08-11 PROCEDURE — 85610 PROTHROMBIN TIME: CPT

## 2022-08-11 PROCEDURE — 78226 HEPATOBILIARY SYSTEM IMAGING: CPT | Mod: MG

## 2022-11-17 NOTE — PATIENT PROFILE ADULT - NSPROSPHOSPCHAPLAINYN_GEN_A_NUR
Chief complaint: Patient presents with:  Toenail: DFE      History of Present Illness: This 87 year old NIDDM male is seen for follow-up management of elongated toenails.     He says he had Athlete's Foot, but he used an anti-fungal spray. This resolved and he has not needed this recently.    He has a little burning, tingling, and numbness in his feet, but not a lot. He says he has most recently had some numbness in his feet. He says his feet get cold if he is not wearing socks. He also wears socks when sleeping.    Patient says he has been staying active. He likes to dance for exercise.     No further pedal complaints today.       Last HbA1C was 6.5% on 05/04/2022.    Previously 6.5% on 01/03/2022.    Previously  6.3% on 05/03/2021  Previously 6.3% from 01/08/2020  Previously 7.1% on 08/26/2019        Wt Readings from Last 4 Encounters:   06/17/22 64 kg (141 lb)   05/04/22 63.1 kg (139 lb 3.2 oz)   02/25/22 63.5 kg (140 lb)   01/03/22 64.4 kg (142 lb)         BP (!) 154/77 (BP Location: Left arm, Patient Position: Sitting, Cuff Size: Adult Regular)   Pulse 66   Temp 97  F (36.1  C) (Tympanic)   SpO2 98%     Patient Active Problem List   Diagnosis     ACP (advance care planning)     Other specified hypothyroidism     Chronic systolic congestive heart failure (H)     Benign essential hypertension     Hyperlipidemia LDL goal <70     Bilateral carotid artery stenosis     Coronary artery disease involving coronary bypass graft of native heart without angina pectoris     Encounter for diabetic foot exam (H)     Trochanteric bursitis of right hip     Gastroesophageal reflux disease, esophagitis presence not specified     Primary osteoarthritis of right hip     Bilateral primary osteoarthritis of knee      Diabetic polyneuropathy associated with type 2 diabetes mellitus (H)     Chronic bilateral low back pain without sciatica     DDD (degenerative disc disease), lumbar     Allergic arthritis of right hip      Keratoacanthoma of cheek     Atypical squamoproliferative skin lesion     Generalized osteoarthrosis, unspecified site     Primary localized osteoarthrosis of lower leg, unspecified laterality     Type 2 diabetes mellitus with hyperglycemia, without long-term current use of insulin (H)     Atypical chest pain     Pulmonary nodules       Past Surgical History:   Procedure Laterality Date     AS CABG, ARTERY-VEIN, FIVE  11/02/2011    off pump       Current Outpatient Medications   Medication     acetaminophen (ACETAMINOPHEN EXTRA STRENGTH) 500 MG tablet     acetaminophen (TYLENOL) 500 MG tablet     ASPIRIN PO     atorvastatin (LIPITOR) 10 MG tablet     blood glucose (NO BRAND SPECIFIED) lancets standard     blood glucose (NO BRAND SPECIFIED) test strip     blood glucose (NO BRAND SPECIFIED) test strip     blood glucose monitoring (NO BRAND SPECIFIED) meter device kit     diclofenac (VOLTAREN) 1 % topical gel     famotidine (PEPCID) 40 MG tablet     GNP PAIN RELIEF EX-STRENGTH 500 MG tablet     ketoconazole (NIZORAL) 2 % external cream     levothyroxine (SYNTHROID/LEVOTHROID) 137 MCG tablet     levothyroxine (SYNTHROID/LEVOTHROID) 150 MCG tablet     lisinopril (ZESTRIL) 2.5 MG tablet     metFORMIN (GLUCOPHAGE XR) 500 MG 24 hr tablet     metoprolol succinate ER (TOPROL XL) 50 MG 24 hr tablet     miconazole (MICATIN) 2 % external powder     nitroGLYcerin (NITROSTAT) 0.4 MG sublingual tablet     vitamin D3 (CHOLECALCIFEROL) 50 mcg (2000 units) tablet     No current facility-administered medications for this visit.        No Known Allergies    Family History   Problem Relation Age of Onset     Cerebrovascular Disease Father      Coronary Artery Disease Father      Dementia Mother      Coronary Artery Disease Sister      Lung Cancer Sister      Thyroid Disease Daughter        Social History     Socioeconomic History     Marital status:      Spouse name: Luis     Number of children: 5     Years of education: 12      Highest education level: Not on file   Occupational History     Occupation:      Employer: RETIRED   Social Needs     Financial resource strain: Not on file     Food insecurity:     Worry: Not on file     Inability: Not on file     Transportation needs:     Medical: Not on file     Non-medical: Not on file   Tobacco Use     Smoking status: Never Smoker     Smokeless tobacco: Never Used     Tobacco comment: second hand smoke in the house 40 yrs   Substance and Sexual Activity     Alcohol use: No     Alcohol/week: 0.0 oz     Drug use: No     Sexual activity: Never     Partners: Female   Lifestyle     Physical activity:     Days per week: Not on file     Minutes per session: Not on file     Stress: Not on file   Relationships     Social connections:     Talks on phone: Not on file     Gets together: Not on file     Attends Congregation service: Not on file     Active member of club or organization: Not on file     Attends meetings of clubs or organizations: Not on file     Relationship status: Not on file     Intimate partner violence:     Fear of current or ex partner: Not on file     Emotionally abused: Not on file     Physically abused: Not on file     Forced sexual activity: Not on file   Other Topics Concern      Service No     Blood Transfusions Yes     Comment: Ok to recieve      Caffeine Concern Yes     Comment: 3 cups daily      Occupational Exposure Not Asked     Hobby Hazards Not Asked     Sleep Concern Not Asked     Stress Concern Not Asked     Weight Concern Not Asked     Special Diet Not Asked     Back Care Not Asked     Exercise Yes     Comment: walking,       Bike Helmet Not Asked     Seat Belt Yes     Self-Exams Not Asked     Parent/sibling w/ CABG, MI or angioplasty before 65F 55M? Not Asked   Social History Narrative     Not on file       ROS: 10 point ROS neg other than the symptoms noted above in the HPI.  EXAM  Constitutional: healthy, alert and no  distress    Psychiatric: mentation appears normal and affect normal/bright    VASCULAR:  -Dorsalis pedis pulse +1/4 b/l  -Posterior tibial pulse +1/4 b/l  -Capillary refill time < 3 seconds to b/l hallux  -Hair growth Absent to b/l anterior legs and ankles  NEURO:  -Positive for paresthesias to the bilateral foot  -Epicritic and protective sensation diminished to the bilateral foot  DERM:  -Skin mildly dry, flaking to bilateral digits only (improved to the remainder of the foot)   -Skin mild-to-moderately erythematous with flaking of skin to bilateral plantar feet in a moccasin distribution  -Skin temperature mildly cool to bilateral foot  -Toenails elongated, thickened, dystrophic and discolored with subungual debris x 10  MSK:  -Muscle strength of ankles +5/5 for dorsiflexion, plantarflexion, ABDUction and ADDuction b/l    ============================================================    ASSESSMENT:    (L60.3) Onychodystrophy  (primary encounter diagnosis)    (L85.3) Xerosis of skin    (E11.9) Diabetes mellitus type 2, noninsulin dependent (H)    (E11.42) Diabetic polyneuropathy associated with type 2 diabetes mellitus (H)    (I50.22) Chronic systolic congestive heart failure (H)  ---Can affect edema of the lower extremities      PLAN:  -Patient evaluated and examined. Treatment options discussed with no educational barriers noted.  Tinea Pedis: Greatly improved. Continue anti-fungal spry as needed. He has not needed this recently.    -High risk toenail debridement x 10 toenails without incident    -Diabetic Foot Education provided. This included checking the feet daily looking for new new blisters or wounds, wearing shoes at all times when walking including around the house, and avoiding lotion application between the toes. Any sign of infection in the foot warrant's the patient presenting to the ED as soon as possible.    -Patient in agreement with the above treatment plan and all of patient's questions were  answered.      RTC 3 months for diabetic foot exam and high risk nail debridement        Deidre Mishra, LEONIDASM, DPM   no

## 2023-01-26 NOTE — H&P ADULT - NSCORESITESY/N_GEN_A_CORE_RD
Impression: Combined forms of age-related cataract, bilateral: H25.813. Plan: Discussed recommendation for cataract extraction w/ IOL due to current pt complaints along with BCVA and glare. Pt's symptoms are affecting daily life at distance and near. Discussed options for surgery. Recommend OD first, then OS. Patient elects to move forward with surgery. Full discussion R, B, A. Discussed IOL options. All questions answered. Patient wants surgery. RL 2. Schedule for preop.  

Recommend Standard IOL/Toric IOL/Multifocal IOL - confirm with surgeon Yes

## 2023-06-19 PROBLEM — I10 ESSENTIAL (PRIMARY) HYPERTENSION: Chronic | Status: ACTIVE | Noted: 2022-07-29

## 2023-06-19 PROBLEM — E78.5 HYPERLIPIDEMIA, UNSPECIFIED: Chronic | Status: ACTIVE | Noted: 2022-07-29

## 2023-07-12 PROBLEM — Z00.00 ENCOUNTER FOR PREVENTIVE HEALTH EXAMINATION: Status: ACTIVE | Noted: 2023-07-12

## 2023-07-18 ENCOUNTER — EMERGENCY (EMERGENCY)
Facility: HOSPITAL | Age: 58
LOS: 1 days | Discharge: HOME | End: 2023-07-18
Attending: EMERGENCY MEDICINE | Admitting: EMERGENCY MEDICINE
Payer: COMMERCIAL

## 2023-07-18 VITALS
RESPIRATION RATE: 17 BRPM | DIASTOLIC BLOOD PRESSURE: 89 MMHG | SYSTOLIC BLOOD PRESSURE: 160 MMHG | HEART RATE: 82 BPM | OXYGEN SATURATION: 95 % | TEMPERATURE: 99 F

## 2023-07-18 VITALS
RESPIRATION RATE: 16 BRPM | OXYGEN SATURATION: 99 % | DIASTOLIC BLOOD PRESSURE: 91 MMHG | SYSTOLIC BLOOD PRESSURE: 182 MMHG | HEIGHT: 64 IN | HEART RATE: 102 BPM | WEIGHT: 146.17 LBS | TEMPERATURE: 98 F

## 2023-07-18 DIAGNOSIS — Z90.710 ACQUIRED ABSENCE OF BOTH CERVIX AND UTERUS: Chronic | ICD-10-CM

## 2023-07-18 LAB
ALBUMIN SERPL ELPH-MCNC: 3.7 G/DL — SIGNIFICANT CHANGE UP (ref 3.3–5)
ALP SERPL-CCNC: 142 U/L — HIGH (ref 30–120)
ALT FLD-CCNC: 33 U/L DA — SIGNIFICANT CHANGE UP (ref 10–60)
ANION GAP SERPL CALC-SCNC: 9 MMOL/L — SIGNIFICANT CHANGE UP (ref 5–17)
APTT BLD: 33.4 SEC — SIGNIFICANT CHANGE UP (ref 27.5–35.5)
AST SERPL-CCNC: 21 U/L — SIGNIFICANT CHANGE UP (ref 10–40)
BASOPHILS # BLD AUTO: 0.03 K/UL — SIGNIFICANT CHANGE UP (ref 0–0.2)
BASOPHILS NFR BLD AUTO: 0.3 % — SIGNIFICANT CHANGE UP (ref 0–2)
BILIRUB SERPL-MCNC: 0.2 MG/DL — SIGNIFICANT CHANGE UP (ref 0.2–1.2)
BUN SERPL-MCNC: 9 MG/DL — SIGNIFICANT CHANGE UP (ref 7–23)
CALCIUM SERPL-MCNC: 9.1 MG/DL — SIGNIFICANT CHANGE UP (ref 8.4–10.5)
CHLORIDE SERPL-SCNC: 101 MMOL/L — SIGNIFICANT CHANGE UP (ref 96–108)
CO2 SERPL-SCNC: 28 MMOL/L — SIGNIFICANT CHANGE UP (ref 22–31)
CREAT SERPL-MCNC: 0.91 MG/DL — SIGNIFICANT CHANGE UP (ref 0.5–1.3)
D DIMER BLD IA.RAPID-MCNC: 212 NG/ML DDU — SIGNIFICANT CHANGE UP
EGFR: 73 ML/MIN/1.73M2 — SIGNIFICANT CHANGE UP
EOSINOPHIL # BLD AUTO: 0.17 K/UL — SIGNIFICANT CHANGE UP (ref 0–0.5)
EOSINOPHIL NFR BLD AUTO: 1.9 % — SIGNIFICANT CHANGE UP (ref 0–6)
GLUCOSE SERPL-MCNC: 114 MG/DL — HIGH (ref 70–99)
HCT VFR BLD CALC: 35.8 % — SIGNIFICANT CHANGE UP (ref 34.5–45)
HGB BLD-MCNC: 11.4 G/DL — LOW (ref 11.5–15.5)
IMM GRANULOCYTES NFR BLD AUTO: 0.2 % — SIGNIFICANT CHANGE UP (ref 0–0.9)
INR BLD: 0.99 RATIO — SIGNIFICANT CHANGE UP (ref 0.88–1.16)
LIDOCAIN IGE QN: 182 U/L — SIGNIFICANT CHANGE UP (ref 73–393)
LYMPHOCYTES # BLD AUTO: 2.36 K/UL — SIGNIFICANT CHANGE UP (ref 1–3.3)
LYMPHOCYTES # BLD AUTO: 26.3 % — SIGNIFICANT CHANGE UP (ref 13–44)
MCHC RBC-ENTMCNC: 23.1 PG — LOW (ref 27–34)
MCHC RBC-ENTMCNC: 31.8 GM/DL — LOW (ref 32–36)
MCV RBC AUTO: 72.6 FL — LOW (ref 80–100)
MONOCYTES # BLD AUTO: 0.69 K/UL — SIGNIFICANT CHANGE UP (ref 0–0.9)
MONOCYTES NFR BLD AUTO: 7.7 % — SIGNIFICANT CHANGE UP (ref 2–14)
NEUTROPHILS # BLD AUTO: 5.72 K/UL — SIGNIFICANT CHANGE UP (ref 1.8–7.4)
NEUTROPHILS NFR BLD AUTO: 63.6 % — SIGNIFICANT CHANGE UP (ref 43–77)
NRBC # BLD: 0 /100 WBCS — SIGNIFICANT CHANGE UP (ref 0–0)
NT-PROBNP SERPL-SCNC: 110 PG/ML — SIGNIFICANT CHANGE UP (ref 0–125)
PLATELET # BLD AUTO: 449 K/UL — HIGH (ref 150–400)
POTASSIUM SERPL-MCNC: 3.2 MMOL/L — LOW (ref 3.5–5.3)
POTASSIUM SERPL-SCNC: 3.2 MMOL/L — LOW (ref 3.5–5.3)
PROT SERPL-MCNC: 8.2 G/DL — SIGNIFICANT CHANGE UP (ref 6–8.3)
PROTHROM AB SERPL-ACNC: 11.7 SEC — SIGNIFICANT CHANGE UP (ref 10.5–13.4)
RBC # BLD: 4.93 M/UL — SIGNIFICANT CHANGE UP (ref 3.8–5.2)
RBC # FLD: 15.9 % — HIGH (ref 10.3–14.5)
SODIUM SERPL-SCNC: 138 MMOL/L — SIGNIFICANT CHANGE UP (ref 135–145)
TROPONIN I, HIGH SENSITIVITY RESULT: 4.3 NG/L — SIGNIFICANT CHANGE UP
WBC # BLD: 8.99 K/UL — SIGNIFICANT CHANGE UP (ref 3.8–10.5)
WBC # FLD AUTO: 8.99 K/UL — SIGNIFICANT CHANGE UP (ref 3.8–10.5)

## 2023-07-18 PROCEDURE — 71275 CT ANGIOGRAPHY CHEST: CPT | Mod: 26,MA

## 2023-07-18 PROCEDURE — 99285 EMERGENCY DEPT VISIT HI MDM: CPT

## 2023-07-18 PROCEDURE — 93010 ELECTROCARDIOGRAM REPORT: CPT

## 2023-07-18 PROCEDURE — 71045 X-RAY EXAM CHEST 1 VIEW: CPT | Mod: 26

## 2023-07-18 RX ORDER — ONDANSETRON 8 MG/1
4 TABLET, FILM COATED ORAL ONCE
Refills: 0 | Status: COMPLETED | OUTPATIENT
Start: 2023-07-18 | End: 2023-07-18

## 2023-07-18 RX ORDER — LIDOCAINE 4 G/100G
10 CREAM TOPICAL ONCE
Refills: 0 | Status: COMPLETED | OUTPATIENT
Start: 2023-07-18 | End: 2023-07-18

## 2023-07-18 RX ORDER — POTASSIUM CHLORIDE 20 MEQ
40 PACKET (EA) ORAL ONCE
Refills: 0 | Status: COMPLETED | OUTPATIENT
Start: 2023-07-18 | End: 2023-07-18

## 2023-07-18 RX ADMIN — Medication 40 MILLIEQUIVALENT(S): at 22:40

## 2023-07-18 RX ADMIN — ONDANSETRON 4 MILLIGRAM(S): 8 TABLET, FILM COATED ORAL at 22:57

## 2023-07-18 RX ADMIN — Medication 30 MILLILITER(S): at 22:46

## 2023-07-18 RX ADMIN — LIDOCAINE 10 MILLILITER(S): 4 CREAM TOPICAL at 22:47

## 2023-07-18 NOTE — ED ADULT TRIAGE NOTE - CHIEF COMPLAINT QUOTE
Pt c/o midsternal chest discomfort x2 days; seen in urgent care and d/c; primary said for her to come and get checked; describes as burning sensation; slightly worse with deep breathing

## 2023-07-18 NOTE — ED ADULT NURSE NOTE - NS ED NURSE LEVEL OF CONSCIOUSNESS ORIENTATION
[FreeTextEntry1] : discussed w pt \par \par check routine labs as below, she declines STD screening \par \par she has appt w GYN later today \par \par cont routine diet, exercise \par \par will plan to repeat Tdap vaccine next year, last done in 2012 \par \par again suggested dermatology visit to evaluate multiple nevi as a precaution, encouraged sunscreen use \par \par RTO yearly for routine exam or earlier prn if any new concerns 
Oriented - self; Oriented - place; Oriented - time

## 2023-07-18 NOTE — ED ADULT NURSE REASSESSMENT NOTE - NS ED NURSE REASSESS COMMENT FT1
Pt wants to urinate prior to getting EKG; discussed benefits of getting EKG done first but patient insists on using restroom first

## 2023-07-18 NOTE — ED ADULT NURSE NOTE - OBJECTIVE STATEMENT
pt and her family report pt with sternal/epigastric chest discomfort. pt reports pain is worse on deep inspiration. skin warm and dry. NAD. respirations even and unlabored

## 2023-07-19 PROCEDURE — 71275 CT ANGIOGRAPHY CHEST: CPT | Mod: MA

## 2023-07-19 PROCEDURE — 99285 EMERGENCY DEPT VISIT HI MDM: CPT | Mod: 25

## 2023-07-19 PROCEDURE — 85610 PROTHROMBIN TIME: CPT

## 2023-07-19 PROCEDURE — 85730 THROMBOPLASTIN TIME PARTIAL: CPT

## 2023-07-19 PROCEDURE — 83690 ASSAY OF LIPASE: CPT

## 2023-07-19 PROCEDURE — 36415 COLL VENOUS BLD VENIPUNCTURE: CPT

## 2023-07-19 PROCEDURE — 85379 FIBRIN DEGRADATION QUANT: CPT

## 2023-07-19 PROCEDURE — 85025 COMPLETE CBC W/AUTO DIFF WBC: CPT

## 2023-07-19 PROCEDURE — 84484 ASSAY OF TROPONIN QUANT: CPT

## 2023-07-19 PROCEDURE — 71045 X-RAY EXAM CHEST 1 VIEW: CPT

## 2023-07-19 PROCEDURE — 83880 ASSAY OF NATRIURETIC PEPTIDE: CPT

## 2023-07-19 PROCEDURE — 96374 THER/PROPH/DIAG INJ IV PUSH: CPT | Mod: XU

## 2023-07-19 PROCEDURE — 93005 ELECTROCARDIOGRAM TRACING: CPT

## 2023-07-19 PROCEDURE — 80053 COMPREHEN METABOLIC PANEL: CPT

## 2023-07-19 RX ORDER — FAMOTIDINE 10 MG/ML
1 INJECTION INTRAVENOUS
Qty: 14 | Refills: 0
Start: 2023-07-19 | End: 2023-08-01

## 2023-07-19 NOTE — ED PROVIDER NOTE - CLINICAL SUMMARY MEDICAL DECISION MAKING FREE TEXT BOX
58-year-old female history of hyperlipidemia hypertension here with family complaining of 2 days of chest discomfort with associated pain with deep inspiration. States pain only occurs with deep inspiration.  Denies any fever chills upper respiratory symptoms.  Denies any shortness of breath.  Denies any nausea vomiting diarrhea.  Denies any abdominal pain.    r/o acs r/o pe, labs, dimer/trop, XR/CTA showing no PE, pt made aware of pulmonary nodule, f/u with outpatient GI and cardio

## 2023-07-19 NOTE — ED PROVIDER NOTE - CARE PROVIDER_API CALL
Jose Pritchard  Gastroenterology  [address] [place] [address]  Phone: [phone]  Fax: [phone]  Follow Up Time:     Damir Leung  Cardiology  [address], [facility] [address]  [place] [address]  Phone: [phone]  Fax: [phone]  Follow Up Time:

## 2023-07-19 NOTE — ED PROVIDER NOTE - OBJECTIVE STATEMENT
58-year-old female history of hyperlipidemia hypertension here with family complaining of 2 days of chest discomfort with associated pain with deep inspiration. States pain only occurs with deep inspiration.  Denies any fever chills upper respiratory symptoms.  Denies any shortness of breath.  Denies any nausea vomiting diarrhea.  Denies any abdominal pain.

## 2023-07-19 NOTE — ED PROVIDER NOTE - PATIENT PORTAL LINK FT
You can access the FollowMyHealth Patient Portal offered by [facility] by registering at the following website: [email or web address]. By joining [facility]’s FollowMyHealth portal, you will also be able to view your health information using other applications (apps) compatible with our system.

## 2023-07-19 NOTE — ED PROVIDER NOTE - NSFOLLOWUPINSTRUCTIONS_ED_ALL_ED_FT
1) Follow-up with Dr Leung and Dr. Pritchard. Call today / next business day for prompt follow-up.  2) Return to Emergency room for any worsening or persistent pain, weakness, fever, or any other concerning symptoms.  3) See attached instruction sheets for additional information, including information regarding signs and symptoms to look out for, reasons to seek immediate care and other important instructions.  4) Follow-up with any specialists as discussed / noted as well.    Chest pain is an uncomfortable, tight, or painful feeling in the chest. The pain can feel like a crushing, aching, or squeezing pressure. A person can feel a burning or tingling sensation. Chest pain can also be felt in your back, neck, jaw, shoulder, or arm. This pain can be worse when you move, sneeze, or take a deep breath.    Chest pain can be caused by a condition that is life-threatening. This must be treated right away. It can also be caused by something that is not life-threatening. If you have chest pain, it can be hard to know the difference, so it is important to get help right away to make sure that you do not have a serious condition.    Some life-threatening causes of chest pain include:  Heart attack.  A tear in the body's main blood vessel (aortic dissection).  Inflammation around your heart (pericarditis).  A problem in the lungs, such as a blood clot (pulmonary embolism) or a collapsed lung (pneumothorax).  Some non life-threatening causes of chest pain include:  Heartburn.  Anxiety or stress.  Damage to the bones, muscles, and cartilage that make up your chest wall.  Pneumonia or bronchitis.  Shingles infection (varicella-zoster virus).  Your chest pain may come and go. It may also be constant. Your health care provider will do tests and other studies to find the cause of your pain. Treatment will depend on the cause of your chest pain.    Follow these instructions at home:  Medicines    Take over-the-counter and prescription medicines only as told by your health care provider.  If you were prescribed an antibiotic medicine, take it as told by your health care provider. Do not stop taking the antibiotic even if you start to feel better.  Activity    Avoid any activities that cause chest pain.  Do not lift anything that is heavier than 10 lb (4.5 kg), or the limit that you are told, until your health care provider says that it is safe.  Rest as directed by your health care provider.  Return to your normal activities only as told by your health care provider. Ask your health care provider what activities are safe for you.  Lifestyle    A plate along with examples of foods in a healthy diet.  Silhouette of a person sitting on the floor doing yoga.  Do not use any products that contain nicotine or tobacco, such as cigarettes, e-cigarettes, and chewing tobacco. If you need help quitting, ask your health care provider.  Do not drink alcohol.  Make healthy lifestyle changes as recommended. These may include:  Getting regular exercise. Ask your health care provider to suggest some exercises that are safe for you.  Eating a heart-healthy diet. This includes plenty of fresh fruits and vegetables, whole grains, low-fat (lean) protein, and low-fat dairy products. A dietitian can help you find healthy eating options.  Maintaining a healthy weight.  Managing any other health conditions you may have, such as high blood pressure (hypertension) or diabetes.  Reducing stress, such as with yoga or relaxation techniques.  General instructions    Pay attention to any changes in your symptoms.  It is up to you to get the results of any tests that were done. Ask your health care provider, or the department that is doing the tests, when your results will be ready.  Keep all follow-up visits as told by your health care provider. This is important.  You may be asked to go for further testing if your chest pain does not go away.  Contact a health care provider if:  Your chest pain does not go away.  You feel depressed.  You have a fever.  You notice changes in your symptoms or develop new symptoms.  Get help right away if:  Your chest pain gets worse.  You have a cough that gets worse, or you cough up blood.  You have severe pain in your abdomen.  You faint.  You have sudden, unexplained chest discomfort.  You have sudden, unexplained discomfort in your arms, back, neck, or jaw.  You have shortness of breath at any time.  You suddenly start to sweat, or your skin gets clammy.  You feel nausea or you vomit.  You suddenly feel lightheaded or dizzy.  You have severe weakness, or unexplained weakness or fatigue.  Your heart begins to beat quickly, or it feels like it is skipping beats.  These symptoms may represent a serious problem that is an emergency. Do not wait to see if the symptoms will go away. Get medical help right away. Call your local emergency services (911 in the [place] Do not drive yourself to the hospital.    Summary  Chest pain can be caused by a condition that is serious and requires urgent treatment. It may also be caused by something that is not life-threatening.  Your health care provider may do lab tests and other studies to find the cause of your pain.  Follow your health care provider's instructions on taking medicines, making lifestyle changes, and getting emergency treatment if symptoms become worse.  Keep all follow-up visits as told by your health care provider. This includes visits for any further testing if your chest pain does not go away.  This information is not intended to replace advice given to you by your health care provider. Make sure you discuss any questions you have with your health care provider.

## 2023-10-04 ENCOUNTER — APPOINTMENT (OUTPATIENT)
Dept: RHEUMATOLOGY | Facility: CLINIC | Age: 58
End: 2023-10-04
Payer: MEDICAID

## 2023-10-04 ENCOUNTER — RESULT REVIEW (OUTPATIENT)
Age: 58
End: 2023-10-04

## 2023-10-04 VITALS
DIASTOLIC BLOOD PRESSURE: 97 MMHG | HEIGHT: 62 IN | SYSTOLIC BLOOD PRESSURE: 154 MMHG | BODY MASS INDEX: 27.6 KG/M2 | HEART RATE: 90 BPM | WEIGHT: 150 LBS | OXYGEN SATURATION: 96 %

## 2023-10-04 DIAGNOSIS — M25.50 PAIN IN UNSPECIFIED JOINT: ICD-10-CM

## 2023-10-04 DIAGNOSIS — Z86.79 PERSONAL HISTORY OF OTHER DISEASES OF THE CIRCULATORY SYSTEM: ICD-10-CM

## 2023-10-04 PROCEDURE — 99204 OFFICE O/P NEW MOD 45 MIN: CPT | Mod: 25

## 2023-10-04 PROCEDURE — 96372 THER/PROPH/DIAG INJ SC/IM: CPT

## 2023-10-04 RX ORDER — TRIAMCINOLONE ACETONIDE 80 MG/ML
80 INJECTION, SUSPENSION INTRA-ARTICULAR; INTRAMUSCULAR
Qty: 8 | Refills: 0 | Status: COMPLETED | OUTPATIENT
Start: 2023-10-04

## 2023-10-04 RX ADMIN — TRIAMCINOLONE ACETONIDE 0 MG/ML: 80 INJECTION, SUSPENSION INTRA-ARTICULAR; INTRAMUSCULAR at 00:00

## 2023-10-06 ENCOUNTER — LABORATORY RESULT (OUTPATIENT)
Age: 58
End: 2023-10-06

## 2023-10-09 LAB
ALBUMIN SERPL ELPH-MCNC: 4.3 G/DL
ALP BLD-CCNC: 92 U/L
ALT SERPL-CCNC: 10 U/L
ANION GAP SERPL CALC-SCNC: 13 MMOL/L
AST SERPL-CCNC: 18 U/L
BASOPHILS # BLD AUTO: 0.02 K/UL
BASOPHILS NFR BLD AUTO: 0.2 %
BILIRUB SERPL-MCNC: 0.2 MG/DL
BUN SERPL-MCNC: 14 MG/DL
CALCIUM SERPL-MCNC: 9.3 MG/DL
CCP AB SER IA-ACNC: >250 UNITS
CHLORIDE SERPL-SCNC: 107 MMOL/L
CO2 SERPL-SCNC: 23 MMOL/L
CREAT SERPL-MCNC: 0.73 MG/DL
CRP SERPL-MCNC: <3 MG/L
EGFR: 95 ML/MIN/1.73M2
EOSINOPHIL # BLD AUTO: 0.12 K/UL
EOSINOPHIL NFR BLD AUTO: 1.5 %
ERYTHROCYTE [SEDIMENTATION RATE] IN BLOOD BY WESTERGREN METHOD: 57 MM/HR
GLUCOSE SERPL-MCNC: 106 MG/DL
HCT VFR BLD CALC: 33.2 %
HGB BLD-MCNC: 10.4 G/DL
IMM GRANULOCYTES NFR BLD AUTO: 0.5 %
LYMPHOCYTES # BLD AUTO: 2.12 K/UL
LYMPHOCYTES NFR BLD AUTO: 26.5 %
MAN DIFF?: NORMAL
MCHC RBC-ENTMCNC: 23 PG
MCHC RBC-ENTMCNC: 31.3 GM/DL
MCV RBC AUTO: 73.5 FL
MONOCYTES # BLD AUTO: 0.57 K/UL
MONOCYTES NFR BLD AUTO: 7.1 %
NEUTROPHILS # BLD AUTO: 5.14 K/UL
NEUTROPHILS NFR BLD AUTO: 64.2 %
PLATELET # BLD AUTO: 416 K/UL
POTASSIUM SERPL-SCNC: 4.1 MMOL/L
PROT SERPL-MCNC: 7.3 G/DL
RBC # BLD: 4.52 M/UL
RBC # FLD: 16.7 %
RF+CCP IGG SER-IMP: ABNORMAL
RHEUMATOID FACT SER QL: 54 IU/ML
SODIUM SERPL-SCNC: 143 MMOL/L
TSH SERPL-ACNC: 1.74 UIU/ML
WBC # FLD AUTO: 8.01 K/UL

## 2023-10-10 LAB
ANA PAT FLD IF-IMP: ABNORMAL
ANA SER IF-ACNC: ABNORMAL

## 2023-10-11 LAB
ALBUMIN MFR SERPL ELPH: 54.3 %
ALBUMIN SERPL-MCNC: 3.9 G/DL
ALBUMIN/GLOB SERPL: 1.2 RATIO
ALPHA1 GLOB MFR SERPL ELPH: 3.9 %
ALPHA1 GLOB SERPL ELPH-MCNC: 0.3 G/DL
ALPHA2 GLOB MFR SERPL ELPH: 11.2 %
ALPHA2 GLOB SERPL ELPH-MCNC: 0.8 G/DL
B-GLOBULIN MFR SERPL ELPH: 12.5 %
B-GLOBULIN SERPL ELPH-MCNC: 0.9 G/DL
GAMMA GLOB FLD ELPH-MCNC: 1.3 G/DL
GAMMA GLOB MFR SERPL ELPH: 18.1 %
INTERPRETATION SERPL IEP-IMP: NORMAL
M PROTEIN SPEC IFE-MCNC: NORMAL
PROT SERPL-MCNC: 7.2 G/DL
PROT SERPL-MCNC: 7.2 G/DL

## 2023-10-13 LAB — G6PD SER-CCNC: 17.4 U/G HGB

## 2023-10-20 ENCOUNTER — APPOINTMENT (OUTPATIENT)
Dept: RADIOLOGY | Facility: CLINIC | Age: 58
End: 2023-10-20
Payer: MEDICARE

## 2023-10-20 ENCOUNTER — OUTPATIENT (OUTPATIENT)
Dept: OUTPATIENT SERVICES | Facility: HOSPITAL | Age: 58
LOS: 1 days | End: 2023-10-20
Payer: COMMERCIAL

## 2023-10-20 DIAGNOSIS — Z90.710 ACQUIRED ABSENCE OF BOTH CERVIX AND UTERUS: Chronic | ICD-10-CM

## 2023-10-20 DIAGNOSIS — M25.50 PAIN IN UNSPECIFIED JOINT: ICD-10-CM

## 2023-10-20 PROCEDURE — 73110 X-RAY EXAM OF WRIST: CPT | Mod: 26,LT,RT

## 2023-10-20 PROCEDURE — 73130 X-RAY EXAM OF HAND: CPT | Mod: 26,LT,RT

## 2023-10-20 PROCEDURE — 73110 X-RAY EXAM OF WRIST: CPT

## 2023-10-20 PROCEDURE — 73630 X-RAY EXAM OF FOOT: CPT

## 2023-10-20 PROCEDURE — 73630 X-RAY EXAM OF FOOT: CPT | Mod: 26,LT,RT

## 2023-10-20 PROCEDURE — 73130 X-RAY EXAM OF HAND: CPT

## 2023-10-25 NOTE — H&P ADULT - ASSESSMENT
57 year old female with PMH HTN, HLD, s/p hysterectomy, here with cholelithiasis, possible choledocholithiasis, r/o acute cholecystitis.
26-Oct-2023

## 2023-11-08 ENCOUNTER — APPOINTMENT (OUTPATIENT)
Dept: RHEUMATOLOGY | Facility: CLINIC | Age: 58
End: 2023-11-08
Payer: MEDICARE

## 2023-11-08 VITALS
OXYGEN SATURATION: 94 % | TEMPERATURE: 98.9 F | DIASTOLIC BLOOD PRESSURE: 89 MMHG | BODY MASS INDEX: 26.52 KG/M2 | SYSTOLIC BLOOD PRESSURE: 138 MMHG | WEIGHT: 145 LBS | HEART RATE: 86 BPM

## 2023-11-08 DIAGNOSIS — M05.79 RHEUMATOID ARTHRITIS WITH RHEUMATOID FACTOR OF MULTIPLE SITES W/OUT ORGAN OR SYSTEMS INVOLVEMENT: ICD-10-CM

## 2023-11-08 DIAGNOSIS — R76.8 OTHER SPECIFIED ABNORMAL IMMUNOLOGICAL FINDINGS IN SERUM: ICD-10-CM

## 2023-11-08 DIAGNOSIS — D64.9 ANEMIA, UNSPECIFIED: ICD-10-CM

## 2023-11-08 PROCEDURE — 99214 OFFICE O/P EST MOD 30 MIN: CPT

## 2023-11-08 RX ORDER — CHLORHEXIDINE GLUCONATE 4 %
325 (65 FE) LIQUID (ML) TOPICAL DAILY
Qty: 90 | Refills: 1 | Status: ACTIVE | COMMUNITY
Start: 2023-11-08 | End: 1900-01-01

## 2023-11-16 RX ORDER — HYDROXYCHLOROQUINE SULFATE 200 MG/1
200 TABLET, FILM COATED ORAL
Qty: 180 | Refills: 1 | Status: ACTIVE | COMMUNITY
Start: 2023-11-08 | End: 1900-01-01

## 2024-05-08 ENCOUNTER — APPOINTMENT (OUTPATIENT)
Dept: RHEUMATOLOGY | Facility: CLINIC | Age: 59
End: 2024-05-08

## 2024-08-16 ENCOUNTER — APPOINTMENT (OUTPATIENT)
Dept: RHEUMATOLOGY | Facility: CLINIC | Age: 59
End: 2024-08-16
Payer: MEDICAID

## 2024-08-16 VITALS
DIASTOLIC BLOOD PRESSURE: 92 MMHG | OXYGEN SATURATION: 97 % | HEART RATE: 91 BPM | BODY MASS INDEX: 27.05 KG/M2 | HEIGHT: 62 IN | SYSTOLIC BLOOD PRESSURE: 173 MMHG | WEIGHT: 147 LBS

## 2024-08-16 PROCEDURE — 99215 OFFICE O/P EST HI 40 MIN: CPT | Mod: 25

## 2024-08-16 PROCEDURE — 96372 THER/PROPH/DIAG INJ SC/IM: CPT

## 2024-08-16 RX ORDER — LEFLUNOMIDE 10 MG/1
10 TABLET, FILM COATED ORAL
Qty: 30 | Refills: 1 | Status: ACTIVE | COMMUNITY
Start: 2024-08-16 | End: 1900-01-01

## 2024-08-16 RX ORDER — TRIAMCINOLONE ACETONIDE 80 MG/ML
80 INJECTION, SUSPENSION INTRA-ARTICULAR; INTRAMUSCULAR
Qty: 8 | Refills: 0 | Status: COMPLETED | OUTPATIENT
Start: 2024-08-16

## 2024-08-16 RX ADMIN — TRIAMCINOLONE ACETONIDE 0 MG/ML: 80 INJECTION, SUSPENSION INTRA-ARTICULAR; INTRAMUSCULAR at 00:00

## 2024-08-19 LAB
ALBUMIN SERPL ELPH-MCNC: 4.3 G/DL
ALP BLD-CCNC: 97 U/L
ALT SERPL-CCNC: 9 U/L
ANION GAP SERPL CALC-SCNC: 13 MMOL/L
AST SERPL-CCNC: 18 U/L
BASOPHILS # BLD AUTO: 0.02 K/UL
BASOPHILS NFR BLD AUTO: 0.3 %
BILIRUB SERPL-MCNC: 0.2 MG/DL
BUN SERPL-MCNC: 14 MG/DL
C3 SERPL-MCNC: 123 MG/DL
C4 SERPL-MCNC: 30 MG/DL
CALCIUM SERPL-MCNC: 9.1 MG/DL
CHLORIDE SERPL-SCNC: 106 MMOL/L
CO2 SERPL-SCNC: 22 MMOL/L
CREAT SERPL-MCNC: 0.83 MG/DL
CRP SERPL-MCNC: 5 MG/L
DSDNA AB SER-ACNC: <1 IU/ML
EGFR: 81 ML/MIN/1.73M2
ENA RNP AB SER IA-ACNC: <0.2 AL
ENA SM AB SER IA-ACNC: <0.2 AL
ENA SS-A AB SER IA-ACNC: <0.2 AL
ENA SS-B AB SER IA-ACNC: <0.2 AL
EOSINOPHIL # BLD AUTO: 0.23 K/UL
EOSINOPHIL NFR BLD AUTO: 3.6 %
ERYTHROCYTE [SEDIMENTATION RATE] IN BLOOD BY WESTERGREN METHOD: 48 MM/HR
FERRITIN SERPL-MCNC: 11 NG/ML
FOLATE SERPL-MCNC: 3.9 NG/ML
GLUCOSE SERPL-MCNC: 103 MG/DL
HAV IGM SER QL: NONREACTIVE
HBV CORE IGM SER QL: NONREACTIVE
HBV SURFACE AG SER QL: NONREACTIVE
HCT VFR BLD CALC: 35.1 %
HCV AB SER QL: NONREACTIVE
HCV S/CO RATIO: 0.19 S/CO
HGB BLD-MCNC: 10 G/DL
IMM GRANULOCYTES NFR BLD AUTO: 0.3 %
IRON SATN MFR SERPL: 7 %
IRON SERPL-MCNC: 27 UG/DL
LYMPHOCYTES # BLD AUTO: 1.68 K/UL
LYMPHOCYTES NFR BLD AUTO: 26 %
MAN DIFF?: NORMAL
MCHC RBC-ENTMCNC: 22 PG
MCHC RBC-ENTMCNC: 28.5 GM/DL
MCV RBC AUTO: 77.1 FL
MONOCYTES # BLD AUTO: 0.53 K/UL
MONOCYTES NFR BLD AUTO: 8.2 %
NEUTROPHILS # BLD AUTO: 3.97 K/UL
NEUTROPHILS NFR BLD AUTO: 61.6 %
PLATELET # BLD AUTO: 366 K/UL
POTASSIUM SERPL-SCNC: 4 MMOL/L
PROT SERPL-MCNC: 7.4 G/DL
RBC # BLD: 4.55 M/UL
RBC # FLD: 15.4 %
SODIUM SERPL-SCNC: 140 MMOL/L
THYROGLOB AB SERPL-ACNC: <20 IU/ML
THYROPEROXIDASE AB SERPL IA-ACNC: 25.9 IU/ML
TIBC SERPL-MCNC: 386 UG/DL
TSH SERPL-ACNC: 3.18 UIU/ML
UIBC SERPL-MCNC: 359 UG/DL
VIT B12 SERPL-MCNC: 227 PG/ML
WBC # FLD AUTO: 6.45 K/UL

## 2024-08-21 LAB
ANA SER IF-ACNC: NEGATIVE
M TB IFN-G BLD-IMP: NEGATIVE
QUANTIFERON TB PLUS MITOGEN MINUS NIL: 0.7 IU/ML
QUANTIFERON TB PLUS NIL: 0.08 IU/ML
QUANTIFERON TB PLUS TB1 MINUS NIL: 0.06 IU/ML
QUANTIFERON TB PLUS TB2 MINUS NIL: 0.06 IU/ML

## 2024-10-11 ENCOUNTER — APPOINTMENT (OUTPATIENT)
Dept: RHEUMATOLOGY | Facility: CLINIC | Age: 59
End: 2024-10-11
Payer: MEDICARE

## 2024-10-11 VITALS
WEIGHT: 146 LBS | OXYGEN SATURATION: 96 % | SYSTOLIC BLOOD PRESSURE: 149 MMHG | DIASTOLIC BLOOD PRESSURE: 82 MMHG | HEIGHT: 61 IN | BODY MASS INDEX: 27.56 KG/M2 | HEART RATE: 73 BPM

## 2024-10-11 PROCEDURE — 99204 OFFICE O/P NEW MOD 45 MIN: CPT

## 2024-12-13 ENCOUNTER — APPOINTMENT (OUTPATIENT)
Dept: RHEUMATOLOGY | Facility: CLINIC | Age: 59
End: 2024-12-13

## 2025-03-04 ENCOUNTER — APPOINTMENT (OUTPATIENT)
Dept: RHEUMATOLOGY | Facility: CLINIC | Age: 60
End: 2025-03-04
Payer: MEDICARE

## 2025-03-04 VITALS
SYSTOLIC BLOOD PRESSURE: 161 MMHG | HEIGHT: 61 IN | OXYGEN SATURATION: 97 % | WEIGHT: 160 LBS | HEART RATE: 83 BPM | BODY MASS INDEX: 30.21 KG/M2 | DIASTOLIC BLOOD PRESSURE: 77 MMHG

## 2025-03-04 PROCEDURE — 96372 THER/PROPH/DIAG INJ SC/IM: CPT

## 2025-03-04 PROCEDURE — 99214 OFFICE O/P EST MOD 30 MIN: CPT | Mod: 25

## 2025-03-04 RX ORDER — TRIAMCINOLONE ACETONIDE 80 MG/ML
80 INJECTION, SUSPENSION INTRA-ARTICULAR; INTRAMUSCULAR
Qty: 8 | Refills: 0 | Status: COMPLETED | OUTPATIENT
Start: 2025-03-04

## 2025-03-04 RX ADMIN — TRIAMCINOLONE ACETONIDE 0 MG/ML: 80 INJECTION, SUSPENSION INTRA-ARTICULAR; INTRAMUSCULAR at 00:00

## 2025-03-05 LAB
ALBUMIN SERPL ELPH-MCNC: 4.1 G/DL
ALP BLD-CCNC: 93 U/L
ALT SERPL-CCNC: 11 U/L
ANION GAP SERPL CALC-SCNC: 12 MMOL/L
AST SERPL-CCNC: 18 U/L
BASOPHILS # BLD AUTO: 0.03 K/UL
BASOPHILS NFR BLD AUTO: 0.5 %
BILIRUB SERPL-MCNC: 0.4 MG/DL
BUN SERPL-MCNC: 12 MG/DL
CALCIUM SERPL-MCNC: 9.2 MG/DL
CHLORIDE SERPL-SCNC: 106 MMOL/L
CO2 SERPL-SCNC: 26 MMOL/L
CREAT SERPL-MCNC: 0.84 MG/DL
CRP SERPL-MCNC: <3 MG/L
EGFRCR SERPLBLD CKD-EPI 2021: 80 ML/MIN/1.73M2
EOSINOPHIL # BLD AUTO: 0.25 K/UL
EOSINOPHIL NFR BLD AUTO: 3.8 %
ERYTHROCYTE [SEDIMENTATION RATE] IN BLOOD BY WESTERGREN METHOD: 44 MM/HR
FERRITIN SERPL-MCNC: 12 NG/ML
FOLATE SERPL-MCNC: 9.5 NG/ML
GLUCOSE SERPL-MCNC: 92 MG/DL
HCT VFR BLD CALC: 35.3 %
HGB BLD-MCNC: 10.7 G/DL
IMM GRANULOCYTES NFR BLD AUTO: 0.2 %
IRON SATN MFR SERPL: 13 %
IRON SERPL-MCNC: 50 UG/DL
LYMPHOCYTES # BLD AUTO: 1.7 K/UL
LYMPHOCYTES NFR BLD AUTO: 26 %
MAN DIFF?: NORMAL
MCHC RBC-ENTMCNC: 23 PG
MCHC RBC-ENTMCNC: 30.3 G/DL
MCV RBC AUTO: 75.9 FL
MONOCYTES # BLD AUTO: 0.53 K/UL
MONOCYTES NFR BLD AUTO: 8.1 %
NEUTROPHILS # BLD AUTO: 4.01 K/UL
NEUTROPHILS NFR BLD AUTO: 61.4 %
PLATELET # BLD AUTO: 325 K/UL
POTASSIUM SERPL-SCNC: 3.8 MMOL/L
PROT SERPL-MCNC: 7 G/DL
RBC # BLD: 4.65 M/UL
RBC # FLD: 16.6 %
SODIUM SERPL-SCNC: 144 MMOL/L
TIBC SERPL-MCNC: 395 UG/DL
UIBC SERPL-MCNC: 345 UG/DL
VIT B12 SERPL-MCNC: 287 PG/ML
WBC # FLD AUTO: 6.53 K/UL

## 2025-06-17 ENCOUNTER — APPOINTMENT (OUTPATIENT)
Age: 60
End: 2025-06-17

## 2025-07-15 ENCOUNTER — APPOINTMENT (OUTPATIENT)
Dept: RHEUMATOLOGY | Facility: CLINIC | Age: 60
End: 2025-07-15
Payer: SELF-PAY

## 2025-07-15 VITALS
HEIGHT: 61 IN | WEIGHT: 160 LBS | OXYGEN SATURATION: 97 % | HEART RATE: 86 BPM | DIASTOLIC BLOOD PRESSURE: 103 MMHG | BODY MASS INDEX: 30.21 KG/M2 | SYSTOLIC BLOOD PRESSURE: 163 MMHG

## 2025-07-15 LAB
BASOPHILS # BLD AUTO: 0.02 K/UL
BASOPHILS NFR BLD AUTO: 0.3 %
EOSINOPHIL # BLD AUTO: 0.24 K/UL
EOSINOPHIL NFR BLD AUTO: 3.2 %
ERYTHROCYTE [SEDIMENTATION RATE] IN BLOOD BY WESTERGREN METHOD: 86 MM/HR
HCT VFR BLD CALC: 35.5 %
HGB BLD-MCNC: 11.2 G/DL
IMM GRANULOCYTES NFR BLD AUTO: 0.3 %
LYMPHOCYTES # BLD AUTO: 1.83 K/UL
LYMPHOCYTES NFR BLD AUTO: 24.6 %
MAN DIFF?: NORMAL
MCHC RBC-ENTMCNC: 24 PG
MCHC RBC-ENTMCNC: 31.5 G/DL
MCV RBC AUTO: 76.2 FL
MONOCYTES # BLD AUTO: 0.59 K/UL
MONOCYTES NFR BLD AUTO: 7.9 %
NEUTROPHILS # BLD AUTO: 4.73 K/UL
NEUTROPHILS NFR BLD AUTO: 63.7 %
PLATELET # BLD AUTO: 418 K/UL
RBC # BLD: 4.66 M/UL
RBC # FLD: 17.2 %
WBC # FLD AUTO: 7.43 K/UL

## 2025-07-15 PROCEDURE — 96372 THER/PROPH/DIAG INJ SC/IM: CPT

## 2025-07-15 PROCEDURE — 99214 OFFICE O/P EST MOD 30 MIN: CPT | Mod: 25

## 2025-07-15 RX ORDER — LEFLUNOMIDE 20 MG/1
20 TABLET, FILM COATED ORAL DAILY
Qty: 90 | Refills: 0 | Status: ACTIVE | COMMUNITY
Start: 2025-07-15 | End: 1900-01-01

## 2025-07-15 RX ORDER — TRIAMCINOLONE ACETONIDE 80 MG/ML
80 INJECTION, SUSPENSION INTRA-ARTICULAR; INTRAMUSCULAR
Qty: 8 | Refills: 0 | Status: COMPLETED | OUTPATIENT
Start: 2025-07-15

## 2025-07-15 RX ADMIN — TRIAMCINOLONE ACETONIDE 0 MG/ML: 80 INJECTION, SUSPENSION INTRA-ARTICULAR; INTRAMUSCULAR at 00:00

## 2025-07-16 LAB
ALBUMIN SERPL ELPH-MCNC: 4.2 G/DL
ALP BLD-CCNC: 146 U/L
ALT SERPL-CCNC: 18 U/L
ANION GAP SERPL CALC-SCNC: 13 MMOL/L
AST SERPL-CCNC: 27 U/L
BILIRUB SERPL-MCNC: 0.3 MG/DL
BUN SERPL-MCNC: 9 MG/DL
CALCIUM SERPL-MCNC: 9.4 MG/DL
CHLORIDE SERPL-SCNC: 108 MMOL/L
CO2 SERPL-SCNC: 23 MMOL/L
CREAT SERPL-MCNC: 0.79 MG/DL
CRP SERPL-MCNC: 14 MG/L
EGFRCR SERPLBLD CKD-EPI 2021: 85 ML/MIN/1.73M2
GLUCOSE SERPL-MCNC: 94 MG/DL
POTASSIUM SERPL-SCNC: 3.9 MMOL/L
PROT SERPL-MCNC: 7.6 G/DL
SODIUM SERPL-SCNC: 143 MMOL/L